# Patient Record
Sex: FEMALE | Race: WHITE | NOT HISPANIC OR LATINO | Employment: FULL TIME | URBAN - METROPOLITAN AREA
[De-identification: names, ages, dates, MRNs, and addresses within clinical notes are randomized per-mention and may not be internally consistent; named-entity substitution may affect disease eponyms.]

---

## 2017-03-13 RX ORDER — ALPRAZOLAM 0.5 MG/1
TABLET ORAL
Qty: 30 TAB | Refills: 0 | Status: SHIPPED
Start: 2017-03-13 | End: 2017-03-16 | Stop reason: SDUPTHER

## 2017-03-14 NOTE — TELEPHONE ENCOUNTER
Was the patient seen in the last year in this department? Yes  LAST OV 3/17/16    Does patient have an active prescription for medications requested? No     Received Request Via: Pharmacy

## 2017-03-16 ENCOUNTER — OFFICE VISIT (OUTPATIENT)
Dept: MEDICAL GROUP | Age: 37
End: 2017-03-16
Payer: COMMERCIAL

## 2017-03-16 VITALS
SYSTOLIC BLOOD PRESSURE: 122 MMHG | BODY MASS INDEX: 19.62 KG/M2 | WEIGHT: 125 LBS | OXYGEN SATURATION: 99 % | HEART RATE: 74 BPM | HEIGHT: 67 IN | DIASTOLIC BLOOD PRESSURE: 68 MMHG | TEMPERATURE: 97.8 F

## 2017-03-16 DIAGNOSIS — F41.9 ANXIETY: ICD-10-CM

## 2017-03-16 DIAGNOSIS — Z23 NEED FOR TDAP VACCINATION: ICD-10-CM

## 2017-03-16 DIAGNOSIS — E78.2 MIXED HYPERLIPIDEMIA: ICD-10-CM

## 2017-03-16 DIAGNOSIS — F51.01 PRIMARY INSOMNIA: ICD-10-CM

## 2017-03-16 DIAGNOSIS — G43.109 MIGRAINE WITH AURA AND WITHOUT STATUS MIGRAINOSUS, NOT INTRACTABLE: ICD-10-CM

## 2017-03-16 PROCEDURE — 90471 IMMUNIZATION ADMIN: CPT | Performed by: INTERNAL MEDICINE

## 2017-03-16 PROCEDURE — 90715 TDAP VACCINE 7 YRS/> IM: CPT | Performed by: INTERNAL MEDICINE

## 2017-03-16 PROCEDURE — 99214 OFFICE O/P EST MOD 30 MIN: CPT | Mod: 25 | Performed by: INTERNAL MEDICINE

## 2017-03-16 RX ORDER — ALPRAZOLAM 0.5 MG/1
0.5 TABLET ORAL 2 TIMES DAILY PRN
Qty: 30 TAB | Refills: 3 | Status: SHIPPED | OUTPATIENT
Start: 2017-03-16 | End: 2018-02-11 | Stop reason: SDUPTHER

## 2017-03-16 RX ORDER — OXYCODONE HYDROCHLORIDE AND ACETAMINOPHEN 5; 325 MG/1; MG/1
1-2 TABLET ORAL EVERY 4 HOURS PRN
Qty: 30 TAB | Refills: 0 | Status: SHIPPED | OUTPATIENT
Start: 2017-03-16 | End: 2021-01-21

## 2017-03-16 ASSESSMENT — ENCOUNTER SYMPTOMS
CARDIOVASCULAR NEGATIVE: 1
MUSCULOSKELETAL NEGATIVE: 1
RESPIRATORY NEGATIVE: 1
GASTROINTESTINAL NEGATIVE: 1
NEUROLOGICAL NEGATIVE: 1
PSYCHIATRIC NEGATIVE: 1
CONSTITUTIONAL NEGATIVE: 1
EYES NEGATIVE: 1

## 2017-03-16 ASSESSMENT — PAIN SCALES - GENERAL: PAINLEVEL: 2=MINIMAL-SLIGHT

## 2017-03-16 ASSESSMENT — PATIENT HEALTH QUESTIONNAIRE - PHQ9: CLINICAL INTERPRETATION OF PHQ2 SCORE: 0

## 2017-03-16 NOTE — MR AVS SNAPSHOT
"Gem Grubbs   3/16/2017 11:00 AM   Office Visit   MRN: [id number]    Department:  [facility]   Dept Phone:  [phone]    Description:  Female : 1980   Provider:  Giuseppe See M.D.           Reason for Visit     Follow-Up med refill xanax/percot/needs lab orders      Allergies as of 3/16/2017     Allergen Noted Reactions    Codeine 2014         You were diagnosed with     Anxiety   [672527]       Primary insomnia   [213823]       Migraine with aura and without status migrainosus, not intractable   [911676]       Need for Tdap vaccination   [[id number]]         Vital Signs     Blood Pressure Pulse Temperature Height Weight Body Mass Index    122/68 mmHg 74 36.6 °C (97.8 °F) 1.702 m (5' 7\") 56.7 kg (125 lb) 19.57 kg/m2    Oxygen Saturation Last Menstrual Period Breastfeeding? Smoking Status          99% 2017 No Never Smoker         Basic Information     Date Of Birth Sex Race Ethnicity Preferred Language    1980 Female White Non- English      Problem List              ICD-10-CM Priority Class Noted - Resolved    Migraine with aura and without status migrainosus, not intractable G43.109   8/10/2015 - Present    Anxiety F41.9   8/10/2015 - Present    Insomnia G47.00   8/10/2015 - Present      Health Maintenance        Date Due Completion Dates    IMM DTaP/Tdap/Td Vaccine (1 - Tdap) 1999 ---    IMM INFLUENZA (1) 2016 ---    PAP SMEAR 2019, 2013 (Prv Comp)    Override on 2013: Previously completed            Current Immunizations     Tdap Vaccine 3/16/2017      Below and/or attached are the medications your provider expects you to take. Review all of your home medications and newly ordered medications with your provider and/or pharmacist. Follow medication instructions as directed by your provider and/or pharmacist. Please keep your medication list with you and share with your provider. Update the information when medications are " discontinued, doses are changed, or new medications (including over-the-counter products) are added; and carry medication information at all times in the event of emergency situations     Allergies:  CODEINE - (reactions not documented)               Medications  Valid as of: March 16, 2017 - 12:25 PM    Generic Name Brand Name Tablet Size Instructions for use    ALPRAZolam (Tab) XANAX 0.5 MG Take 1 Tab by mouth 2 times a day as needed. For anxiety and/or insomnia        Oxycodone-Acetaminophen (Tab) PERCOCET 5-325 MG Take 1-2 Tabs by mouth every four hours as needed.        SUMAtriptan Succinate (Tab) IMITREX 100 MG TAKE ONE AT THE ONSET OF MIGRAINE HEADACHE. NOT TO EXCEED 2 IN A 24-HO UR PERIOD        .                 Medicines prescribed today were sent to:     [id number] - [place], [place] - [address]    [address] [place] [address]    Phone: [phone] Fax: [phone]    Open 24 Hours?: No      Medication refill instructions:       If your prescription bottle indicates you have medication refills left, it is not necessary to call your provider’s office. Please contact your pharmacy and they will refill your medication.    If your prescription bottle indicates you do not have any refills left, you may request refills at any time through one of the following ways: The online [employer] system (except Urgent Care), by calling your provider’s office, or by asking your pharmacy to contact your provider’s office with a refill request. Medication refills are processed only during regular business hours and may not be available until the next business day. Your provider may request additional information or to have a follow-up visit with you prior to refilling your medication.   *Please Note: Medication refills are assigned a new Rx number when refilled electronically. Your pharmacy may indicate that no refills were authorized even though a new prescription for the same medication is available at the  pharmacy. Please request the medicine by name with the pharmacy before contacting your provider for a refill.           MyChart Access Code: Activation code not generated  Current MyChart Status: Active

## 2017-03-17 PROBLEM — E78.2 MIXED HYPERLIPIDEMIA: Status: ACTIVE | Noted: 2017-03-17

## 2017-03-17 NOTE — PROGRESS NOTES
"Subjective:      Gem Grubbs is a 36 y.o. female who presents with Follow-Up  The patient is here for followup of chronic medical problems listed below. The patient is compliant with medications and having no side effects from them. Denies chest pain, abdominal pain, dyspnea, myalgias, or cough.     Patient Active Problem List    Diagnosis Date Noted   • Migraine with aura and without status migrainosus, not intractable 08/10/2015   • Anxiety 08/10/2015   • Insomnia 08/10/2015       Allergies   Allergen Reactions   • Codeine      Outpatient Prescriptions Prior to Visit   Medication Sig Dispense Refill   • sumatriptan (IMITREX) 100 MG tablet TAKE ONE AT THE ONSET OF MIGRAINE HEADACHE. NOT TO EXCEED 2 IN A 24-HO UR PERIOD 9 Tab 13   • alprazolam (XANAX) 0.5 MG Tab TAKE ONE TABLET BY MOUTH TWICE DAILY AS NEEDED FOR SLEEP OR ANXIETY 30 Tab 0   • oxycodone-acetaminophen (PERCOCET) 5-325 MG Tab Take 1-2 Tabs by mouth every four hours as needed. 60 Tab 0     No facility-administered medications prior to visit.               HPI    Review of Systems   Constitutional: Negative.    HENT: Negative.    Eyes: Negative.    Respiratory: Negative.    Cardiovascular: Negative.    Gastrointestinal: Negative.    Genitourinary: Negative.    Musculoskeletal: Negative.    Skin: Negative.    Neurological: Negative.    Endo/Heme/Allergies: Negative.    Psychiatric/Behavioral: Negative.           Objective:     /68 mmHg  Pulse 74  Temp(Src) 36.6 °C (97.8 °F)  Ht 1.702 m (5' 7\")  Wt 56.7 kg (125 lb)  BMI 19.57 kg/m2  SpO2 99%  LMP 03/02/2017  Breastfeeding? No     Physical Exam   Constitutional: She is oriented to person, place, and time. She appears well-developed and well-nourished.   HENT:   Head: Normocephalic and atraumatic.   Right Ear: External ear normal.   Left Ear: External ear normal.   Nose: Nose normal.   Mouth/Throat: Oropharynx is clear and moist.   Eyes: Conjunctivae and EOM are normal. Pupils are " equal, round, and reactive to light. Right eye exhibits no discharge. Left eye exhibits no discharge. No scleral icterus.   Neck: Normal range of motion. Neck supple. No JVD present. No tracheal deviation present. No thyromegaly present.   Cardiovascular: Normal rate, regular rhythm, normal heart sounds and intact distal pulses.  Exam reveals no gallop and no friction rub.    Pulmonary/Chest: Effort normal and breath sounds normal. No stridor. No respiratory distress. She has no wheezes. She has no rales. She exhibits no tenderness.   Abdominal: Soft. Bowel sounds are normal. She exhibits no distension and no mass. There is no tenderness. There is no rebound and no guarding. No hernia.   Musculoskeletal: Normal range of motion. She exhibits no edema or tenderness.   Lymphadenopathy:     She has no cervical adenopathy.   Neurological: She is alert and oriented to person, place, and time. She has normal reflexes. She displays normal reflexes. No cranial nerve deficit. Coordination normal.   Skin: Skin is warm and dry. No rash noted. No erythema. No pallor.   Psychiatric: She has a normal mood and affect. Her behavior is normal. Judgment and thought content normal.   Nursing note and vitals reviewed.  No visits with results within 1 Month(s) from this visit.  Latest known visit with results is:    Hospital Outpatient Visit on 06/22/2016   Component Date Value   • Cytology Reg 06/22/2016 See Path Report    • Source 06/22/2016 Cervical    • HPV Genotype 16 06/22/2016 Negative    • HPV Genotype 18 06/22/2016 Negative    • HPV Other High Risk Louise* 06/22/2016 Negative       No results found for: HBA1C  Lab Results   Component Value Date/Time    SODIUM 139 09/11/2014 02:41 PM    POTASSIUM 3.8 09/11/2014 02:41 PM    CHLORIDE 104 09/11/2014 02:41 PM    CO2 29 09/11/2014 02:41 PM    GLUCOSE 65 09/11/2014 02:41 PM    BUN 14 09/11/2014 02:41 PM    CREATININE 0.98 09/11/2014 02:41 PM    ALKALINE PHOSPHATASE 55 09/11/2014 02:41 PM     AST(SGOT) 18 09/11/2014 02:41 PM    ALT(SGPT) 13 09/11/2014 02:41 PM    TOTAL BILIRUBIN 0.5 09/11/2014 02:41 PM     No results found for: INR  Lab Results   Component Value Date/Time    CHOLESTEROL, 09/11/2014 02:41 PM    * 09/11/2014 02:41 PM    HDL 51 09/11/2014 02:41 PM    TRIGLYCERIDES 124 09/11/2014 02:41 PM       No results found for: TESTOSTERONE  No results found for: TSH  Lab Results   Component Value Date/Time    FREE T-4 1.02 09/11/2014 02:41 PM     No results found for: URICACID  No components found for: VITB12  Lab Results   Component Value Date/Time    25-HYDROXY   VITAMIN D 25 44 09/11/2014 02:41 PM                    Assessment/Plan:     1. AnxietyUnder good control. Continue same regimen.     - alprazolam (XANAX) 0.5 MG Tab; Take 1 Tab by mouth 2 times a day as needed. For anxiety and/or insomnia  Dispense: 30 Tab; Refill: 3    2. Primary insomnia   Under good control. Continue same regimen.  - alprazolam (XANAX) 0.5 MG Tab; Take 1 Tab by mouth 2 times a day as needed. For anxiety and/or insomnia  Dispense: 30 Tab; Refill: 3    3. Migraine with aura and without status migrainosus, not intractable   Under good control. Continue same regimen.  - oxycodone-acetaminophen (PERCOCET) 5-325 MG Tab; Take 1-2 Tabs by mouth every four hours as needed.  Dispense: 30 Tab; Refill: 0    4. Need for Tdap vaccination         - TDAP VACCINE =>6YO IM        5. Hyperlipidemia. Mild no medications. Check lipid panel again as well as other laboratory studies.      30 minute face-to-face encounter took place today.  More than half of this time was spent in the coordination of care of the above problems, as well as counseling.

## 2017-04-17 ENCOUNTER — HOSPITAL ENCOUNTER (OUTPATIENT)
Dept: LAB | Facility: MEDICAL CENTER | Age: 37
End: 2017-04-17
Attending: INTERNAL MEDICINE
Payer: COMMERCIAL

## 2017-04-17 DIAGNOSIS — E78.2 MIXED HYPERLIPIDEMIA: ICD-10-CM

## 2017-04-17 LAB
ALBUMIN SERPL BCP-MCNC: 4.4 G/DL (ref 3.2–4.9)
ALBUMIN/GLOB SERPL: 1.4 G/DL
ALP SERPL-CCNC: 57 U/L (ref 30–99)
ALT SERPL-CCNC: 10 U/L (ref 2–50)
ANION GAP SERPL CALC-SCNC: 4 MMOL/L (ref 0–11.9)
AST SERPL-CCNC: 17 U/L (ref 12–45)
BASOPHILS # BLD AUTO: 0.8 % (ref 0–1.8)
BASOPHILS # BLD: 0.05 K/UL (ref 0–0.12)
BILIRUB SERPL-MCNC: 0.4 MG/DL (ref 0.1–1.5)
BUN SERPL-MCNC: 14 MG/DL (ref 8–22)
CALCIUM SERPL-MCNC: 9.5 MG/DL (ref 8.5–10.5)
CHLORIDE SERPL-SCNC: 107 MMOL/L (ref 96–112)
CHOLEST SERPL-MCNC: 158 MG/DL (ref 100–199)
CO2 SERPL-SCNC: 26 MMOL/L (ref 20–33)
CREAT SERPL-MCNC: 0.9 MG/DL (ref 0.5–1.4)
EOSINOPHIL # BLD AUTO: 0.04 K/UL (ref 0–0.51)
EOSINOPHIL NFR BLD: 0.7 % (ref 0–6.9)
ERYTHROCYTE [DISTWIDTH] IN BLOOD BY AUTOMATED COUNT: 41.1 FL (ref 35.9–50)
GFR SERPL CREATININE-BSD FRML MDRD: >60 ML/MIN/1.73 M 2
GLOBULIN SER CALC-MCNC: 3.1 G/DL (ref 1.9–3.5)
GLUCOSE SERPL-MCNC: 80 MG/DL (ref 65–99)
HCT VFR BLD AUTO: 44.2 % (ref 37–47)
HDLC SERPL-MCNC: 58 MG/DL
HGB BLD-MCNC: 14.7 G/DL (ref 12–16)
IMM GRANULOCYTES # BLD AUTO: 0.02 K/UL (ref 0–0.11)
IMM GRANULOCYTES NFR BLD AUTO: 0.3 % (ref 0–0.9)
LDLC SERPL CALC-MCNC: 90 MG/DL
LYMPHOCYTES # BLD AUTO: 1.78 K/UL (ref 1–4.8)
LYMPHOCYTES NFR BLD: 29.8 % (ref 22–41)
MCH RBC QN AUTO: 29.4 PG (ref 27–33)
MCHC RBC AUTO-ENTMCNC: 33.3 G/DL (ref 33.6–35)
MCV RBC AUTO: 88.4 FL (ref 81.4–97.8)
MONOCYTES # BLD AUTO: 0.41 K/UL (ref 0–0.85)
MONOCYTES NFR BLD AUTO: 6.9 % (ref 0–13.4)
NEUTROPHILS # BLD AUTO: 3.67 K/UL (ref 2–7.15)
NEUTROPHILS NFR BLD: 61.5 % (ref 44–72)
NRBC # BLD AUTO: 0 K/UL
NRBC BLD AUTO-RTO: 0 /100 WBC
PLATELET # BLD AUTO: 269 K/UL (ref 164–446)
PMV BLD AUTO: 11.1 FL (ref 9–12.9)
POTASSIUM SERPL-SCNC: 5.2 MMOL/L (ref 3.6–5.5)
PROT SERPL-MCNC: 7.5 G/DL (ref 6–8.2)
RBC # BLD AUTO: 5 M/UL (ref 4.2–5.4)
SODIUM SERPL-SCNC: 137 MMOL/L (ref 135–145)
TRIGL SERPL-MCNC: 51 MG/DL (ref 0–149)
TSH SERPL DL<=0.005 MIU/L-ACNC: 1.21 UIU/ML (ref 0.3–3.7)
WBC # BLD AUTO: 6 K/UL (ref 4.8–10.8)

## 2017-04-17 PROCEDURE — 36415 COLL VENOUS BLD VENIPUNCTURE: CPT

## 2017-04-17 PROCEDURE — 80061 LIPID PANEL: CPT

## 2017-04-17 PROCEDURE — 85025 COMPLETE CBC W/AUTO DIFF WBC: CPT

## 2017-04-17 PROCEDURE — 84443 ASSAY THYROID STIM HORMONE: CPT

## 2017-04-17 PROCEDURE — 80053 COMPREHEN METABOLIC PANEL: CPT

## 2017-04-25 ENCOUNTER — TELEPHONE (OUTPATIENT)
Dept: MEDICAL GROUP | Age: 37
End: 2017-04-25

## 2017-04-25 NOTE — TELEPHONE ENCOUNTER
ESTABLISHED PATIENT PRE-VISIT PLANNING     Note: Patient will not be contacted if there is no indication to call.     1.  Reviewed note from last office visit with PCP and/or other med group provider: Yes    2.  If any orders were placed at last visit, do we have Results/Consult Notes?        •  Labs - Labs ordered, completed and results are in chart.       •  Imaging - Imaging was not ordered at last office visit.       •  Referrals - No referrals were ordered at last office visit.    3.  Immunizations were updated in [facility] using [facility]?: Yes       •  Web [employer] Recommendations: HEPATITIS A  HEPATITIS B MMR  TD VARICELLA (Chicken Pox)     4.  Patient is due for the following Health Maintenance Topics:   There are no preventive care reminders to display for this patient.    - Patient has completed TDAP Immunization(s) per WebIZ. Chart has been updated.

## 2017-04-26 ENCOUNTER — OFFICE VISIT (OUTPATIENT)
Dept: MEDICAL GROUP | Age: 37
End: 2017-04-26
Payer: COMMERCIAL

## 2017-04-26 VITALS
HEART RATE: 82 BPM | SYSTOLIC BLOOD PRESSURE: 102 MMHG | WEIGHT: 126 LBS | HEIGHT: 67 IN | OXYGEN SATURATION: 99 % | TEMPERATURE: 98.7 F | BODY MASS INDEX: 19.78 KG/M2 | DIASTOLIC BLOOD PRESSURE: 72 MMHG

## 2017-04-26 DIAGNOSIS — Z23 NEED FOR VACCINATION: ICD-10-CM

## 2017-04-26 DIAGNOSIS — F41.9 ANXIETY: ICD-10-CM

## 2017-04-26 DIAGNOSIS — F51.01 PRIMARY INSOMNIA: ICD-10-CM

## 2017-04-26 DIAGNOSIS — G43.109 MIGRAINE WITH AURA AND WITHOUT STATUS MIGRAINOSUS, NOT INTRACTABLE: ICD-10-CM

## 2017-04-26 DIAGNOSIS — E78.2 MIXED HYPERLIPIDEMIA: ICD-10-CM

## 2017-04-26 PROCEDURE — 90471 IMMUNIZATION ADMIN: CPT | Performed by: INTERNAL MEDICINE

## 2017-04-26 PROCEDURE — 90746 HEPB VACCINE 3 DOSE ADULT IM: CPT | Performed by: INTERNAL MEDICINE

## 2017-04-26 PROCEDURE — 99214 OFFICE O/P EST MOD 30 MIN: CPT | Mod: 25 | Performed by: INTERNAL MEDICINE

## 2017-04-26 PROCEDURE — 90632 HEPA VACCINE ADULT IM: CPT | Performed by: INTERNAL MEDICINE

## 2017-04-26 PROCEDURE — 90472 IMMUNIZATION ADMIN EACH ADD: CPT | Performed by: INTERNAL MEDICINE

## 2017-04-26 ASSESSMENT — ENCOUNTER SYMPTOMS
NEUROLOGICAL NEGATIVE: 1
PSYCHIATRIC NEGATIVE: 1
GASTROINTESTINAL NEGATIVE: 1
CARDIOVASCULAR NEGATIVE: 1
MUSCULOSKELETAL NEGATIVE: 1
CONSTITUTIONAL NEGATIVE: 1
RESPIRATORY NEGATIVE: 1
EYES NEGATIVE: 1

## 2017-04-26 NOTE — PROGRESS NOTES
"Subjective:      Gem Grubbs is a 37 y.o. female who presents with Results      Patient Active Problem List    Diagnosis Date Noted   • Need for vaccination- hep A and B- to be done aug 2017 (hep B #2) and oct 2017 (hep B #3 and hep A #2) 04/26/2017   • Mixed hyperlipidemia- mild no meds- resolved 03/17/2017   • Migraine with aura and without status migrainosus, not intractable 08/10/2015   • Anxiety 08/10/2015   • Insomnia 08/10/2015         Allergies   Allergen Reactions   • Codeine      Outpatient Prescriptions Prior to Visit   Medication Sig Dispense Refill   • sumatriptan (IMITREX) 100 MG tablet TAKE ONE AT THE ONSET OF MIGRAINE HEADACHE. NOT TO EXCEED 2 IN A 24-HO UR PERIOD 9 Tab 13   • alprazolam (XANAX) 0.5 MG Tab Take 1 Tab by mouth 2 times a day as needed. For anxiety and/or insomnia 30 Tab 3   • oxycodone-acetaminophen (PERCOCET) 5-325 MG Tab Take 1-2 Tabs by mouth every four hours as needed. 30 Tab 0     No facility-administered medications prior to visit.                 HPI    Review of Systems   Constitutional: Negative.    HENT: Negative.    Eyes: Negative.    Respiratory: Negative.    Cardiovascular: Negative.    Gastrointestinal: Negative.    Genitourinary: Negative.    Musculoskeletal: Negative.    Skin: Negative.    Neurological: Negative.    Endo/Heme/Allergies: Negative.    Psychiatric/Behavioral: Negative.           Objective:     /72 mmHg  Pulse 82  Temp(Src) 37.1 °C (98.7 °F)  Ht 1.695 m (5' 6.73\")  Wt 57.153 kg (126 lb)  BMI 19.89 kg/m2  SpO2 99%     Physical Exam   Constitutional: She is oriented to person, place, and time. She appears well-developed and well-nourished.   HENT:   Head: Normocephalic and atraumatic.   Right Ear: External ear normal.   Left Ear: External ear normal.   Nose: Nose normal.   Mouth/Throat: Oropharynx is clear and moist.   Eyes: Conjunctivae and EOM are normal. Pupils are equal, round, and reactive to light. Right eye exhibits no discharge. " Left eye exhibits no discharge. No scleral icterus.   Neck: Normal range of motion. Neck supple. No JVD present. No tracheal deviation present. No thyromegaly present.   Cardiovascular: Normal rate, regular rhythm, normal heart sounds and intact distal pulses.  Exam reveals no gallop and no friction rub.    Pulmonary/Chest: Effort normal and breath sounds normal. No stridor. No respiratory distress. She has no wheezes. She has no rales. She exhibits no tenderness.   Abdominal: Soft. Bowel sounds are normal. She exhibits no distension and no mass. There is no tenderness. There is no rebound and no guarding. No hernia.   Musculoskeletal: Normal range of motion. She exhibits no edema or tenderness.   Lymphadenopathy:     She has no cervical adenopathy.   Neurological: She is alert and oriented to person, place, and time. She has normal reflexes. She displays normal reflexes. No cranial nerve deficit. Coordination normal.   Skin: Skin is warm and dry. No rash noted. No erythema. No pallor.   Psychiatric: She has a normal mood and affect. Her behavior is normal. Judgment and thought content normal.   Nursing note and vitals reviewed.    Hospital Outpatient Visit on 04/17/2017   Component Date Value   • TSH 04/17/2017 1.210    • Sodium 04/17/2017 137    • Potassium 04/17/2017 5.2    • Chloride 04/17/2017 107    • Co2 04/17/2017 26    • Anion Gap 04/17/2017 4.0    • Glucose 04/17/2017 80    • Bun 04/17/2017 14    • Creatinine 04/17/2017 0.90    • Calcium 04/17/2017 9.5    • AST(SGOT) 04/17/2017 17    • ALT(SGPT) 04/17/2017 10    • Alkaline Phosphatase 04/17/2017 57    • Total Bilirubin 04/17/2017 0.4    • Albumin 04/17/2017 4.4    • Total Protein 04/17/2017 7.5    • Globulin 04/17/2017 3.1    • A-G Ratio 04/17/2017 1.4    • Cholesterol,Tot 04/17/2017 158    • Triglycerides 04/17/2017 51    • HDL 04/17/2017 58    • LDL 04/17/2017 90    • WBC 04/17/2017 6.0    • RBC 04/17/2017 5.00    • Hemoglobin 04/17/2017 14.7    •  Hematocrit 04/17/2017 44.2    • MCV 04/17/2017 88.4    • MCH 04/17/2017 29.4    • MCHC 04/17/2017 33.3*   • RDW 04/17/2017 41.1    • Platelet Count 04/17/2017 269    • MPV 04/17/2017 11.1    • Neutrophils-Polys 04/17/2017 61.50    • Lymphocytes 04/17/2017 29.80    • Monocytes 04/17/2017 6.90    • Eosinophils 04/17/2017 0.70    • Basophils 04/17/2017 0.80    • Immature Granulocytes 04/17/2017 0.30    • Nucleated RBC 04/17/2017 0.00    • Neutrophils (Absolute) 04/17/2017 3.67    • Lymphs (Absolute) 04/17/2017 1.78    • Monos (Absolute) 04/17/2017 0.41    • Eos (Absolute) 04/17/2017 0.04    • Baso (Absolute) 04/17/2017 0.05    • Immature Granulocytes (a* 04/17/2017 0.02    • NRBC (Absolute) 04/17/2017 0.00    • GFR If  04/17/2017 >60    • GFR If Non  [employer]* 04/17/2017 >60       No results found for: HBA1C  Lab Results   Component Value Date/Time    SODIUM 137 04/17/2017 09:31 AM    POTASSIUM 5.2 04/17/2017 09:31 AM    CHLORIDE 107 04/17/2017 09:31 AM    CO2 26 04/17/2017 09:31 AM    GLUCOSE 80 04/17/2017 09:31 AM    BUN 14 04/17/2017 09:31 AM    CREATININE 0.90 04/17/2017 09:31 AM    ALKALINE PHOSPHATASE 57 04/17/2017 09:31 AM    AST(SGOT) 17 04/17/2017 09:31 AM    ALT(SGPT) 10 04/17/2017 09:31 AM    TOTAL BILIRUBIN 0.4 04/17/2017 09:31 AM     No results found for: INR  Lab Results   Component Value Date/Time    CHOLESTEROL, 04/17/2017 09:31 AM    LDL 90 04/17/2017 09:31 AM    HDL 58 04/17/2017 09:31 AM    TRIGLYCERIDES 51 04/17/2017 09:31 AM       No results found for: TESTOSTERONE  No results found for: TSH  Lab Results   Component Value Date/Time    FREE T-4 1.02 09/11/2014 02:41 PM     No results found for: URICACID  No components found for: VITB12  Lab Results   Component Value Date/Time    25-HYDROXY   VITAMIN D 25 44 09/11/2014 02:41 PM                    Assessment/Plan:     1. Mixed hyperlipidemia- mild no meds- resolved     Removed from problem list. Under good control. Continue  excellent dietary practices and exercise.    2. Migraine with aura and without status migrainosus, not intractable   Much better since going on progesterone by GYN.Under good control. Continue same regimen.      3. Anxiety     Under good control. Continue same regimen. No longer requiring significant amounts of Xanax. Rarely uses it.     4. Primary insomnia     Under good control. Continue same regimen. As above     5. Need for vaccination- hep A and B      - HEPATITIS B VACCINE ADULT IM  - HEPATITIS A VACCINE ADULT IM  - HEPATITIS B VACCINE ADULT IM; Future  - HEPATITIS B VACCINE ADULT IM; Future  - HEPATITIS A VACCINE ADULT IM; Future      40 minute face-to-face encounter took place today.  More than half of this time was spent in the coordination of care of the above problems, as well as counseling.

## 2017-04-26 NOTE — PROGRESS NOTES
"Subjective:      Gem Grubbs is a 37 y.o. female who presents with Results  The patient is here for followup of chronic medical problems listed below. The patient is compliant with medications and having no side effects from them. Denies chest pain, abdominal pain, dyspnea, myalgias, or cough.   Patient Active Problem List    Diagnosis Date Noted   • Need for vaccination- hep A and B- to be done aug 2017 (hep B #2) and oct 2017 (hep B #3 and hep A #2) 04/26/2017   • Mixed hyperlipidemia- mild no meds- resolved 03/17/2017   • Migraine with aura and without status migrainosus, not intractable 08/10/2015   • Anxiety 08/10/2015   • Insomnia 08/10/2015     Allergies   Allergen Reactions   • Codeine         Outpatient Prescriptions Prior to Visit   Medication Sig Dispense Refill   • sumatriptan (IMITREX) 100 MG tablet TAKE ONE AT THE ONSET OF MIGRAINE HEADACHE. NOT TO EXCEED 2 IN A 24-HO UR PERIOD 9 Tab 13   • alprazolam (XANAX) 0.5 MG Tab Take 1 Tab by mouth 2 times a day as needed. For anxiety and/or insomnia 30 Tab 3   • oxycodone-acetaminophen (PERCOCET) 5-325 MG Tab Take 1-2 Tabs by mouth every four hours as needed. 30 Tab 0     No facility-administered medications prior to visit.               HPI    Review of Systems   Constitutional: Negative.    HENT: Negative.    Eyes: Negative.    Respiratory: Negative.    Cardiovascular: Negative.    Gastrointestinal: Negative.    Genitourinary: Negative.    Musculoskeletal: Negative.    Skin: Negative.    Neurological: Negative.    Endo/Heme/Allergies: Negative.    Psychiatric/Behavioral: Negative.           Objective:     /72 mmHg  Pulse 82  Temp(Src) 37.1 °C (98.7 °F)  Ht 1.695 m (5' 6.73\")  Wt 57.153 kg (126 lb)  BMI 19.89 kg/m2  SpO2 99%     Physical Exam   Constitutional: She is oriented to person, place, and time. She appears well-developed and well-nourished.   HENT:   Head: Normocephalic and atraumatic.   Right Ear: External ear normal.   Left " Ear: External ear normal.   Nose: Nose normal.   Mouth/Throat: Oropharynx is clear and moist.   Eyes: Conjunctivae and EOM are normal. Pupils are equal, round, and reactive to light. Right eye exhibits no discharge. Left eye exhibits no discharge. No scleral icterus.   Neck: Normal range of motion. Neck supple. No JVD present. No tracheal deviation present. No thyromegaly present.   Cardiovascular: Normal rate, regular rhythm, normal heart sounds and intact distal pulses.  Exam reveals no gallop and no friction rub.    Pulmonary/Chest: Effort normal and breath sounds normal. No stridor. No respiratory distress. She has no wheezes. She has no rales. She exhibits no tenderness.   Abdominal: Soft. Bowel sounds are normal. She exhibits no distension and no mass. There is no tenderness. There is no rebound and no guarding. No hernia.   Musculoskeletal: Normal range of motion. She exhibits no edema or tenderness.   Lymphadenopathy:     She has no cervical adenopathy.   Neurological: She is alert and oriented to person, place, and time. She has normal reflexes. She displays normal reflexes. No cranial nerve deficit. Coordination normal.   Skin: Skin is warm and dry. No rash noted. No erythema. No pallor.   Psychiatric: She has a normal mood and affect. Her behavior is normal. Judgment and thought content normal.   Nursing note and vitals reviewed.    Hospital Outpatient Visit on 04/17/2017   Component Date Value   • TSH 04/17/2017 1.210    • Sodium 04/17/2017 137    • Potassium 04/17/2017 5.2    • Chloride 04/17/2017 107    • Co2 04/17/2017 26    • Anion Gap 04/17/2017 4.0    • Glucose 04/17/2017 80    • Bun 04/17/2017 14    • Creatinine 04/17/2017 0.90    • Calcium 04/17/2017 9.5    • AST(SGOT) 04/17/2017 17    • ALT(SGPT) 04/17/2017 10    • Alkaline Phosphatase 04/17/2017 57    • Total Bilirubin 04/17/2017 0.4    • Albumin 04/17/2017 4.4    • Total Protein 04/17/2017 7.5    • Globulin 04/17/2017 3.1    • A-G Ratio  04/17/2017 1.4    • Cholesterol,Tot 04/17/2017 158    • Triglycerides 04/17/2017 51    • HDL 04/17/2017 58    • LDL 04/17/2017 90    • WBC 04/17/2017 6.0    • RBC 04/17/2017 5.00    • Hemoglobin 04/17/2017 14.7    • Hematocrit 04/17/2017 44.2    • MCV 04/17/2017 88.4    • MCH 04/17/2017 29.4    • MCHC 04/17/2017 33.3*   • RDW 04/17/2017 41.1    • Platelet Count 04/17/2017 269    • MPV 04/17/2017 11.1    • Neutrophils-Polys 04/17/2017 61.50    • Lymphocytes 04/17/2017 29.80    • Monocytes 04/17/2017 6.90    • Eosinophils 04/17/2017 0.70    • Basophils 04/17/2017 0.80    • Immature Granulocytes 04/17/2017 0.30    • Nucleated RBC 04/17/2017 0.00    • Neutrophils (Absolute) 04/17/2017 3.67    • Lymphs (Absolute) 04/17/2017 1.78    • Monos (Absolute) 04/17/2017 0.41    • Eos (Absolute) 04/17/2017 0.04    • Baso (Absolute) 04/17/2017 0.05    • Immature Granulocytes (a* 04/17/2017 0.02    • NRBC (Absolute) 04/17/2017 0.00    • GFR If  04/17/2017 >60    • GFR If Non  Ameri* 04/17/2017 >60       No results found for: HBA1C  Lab Results   Component Value Date/Time    SODIUM 137 04/17/2017 09:31 AM    POTASSIUM 5.2 04/17/2017 09:31 AM    CHLORIDE 107 04/17/2017 09:31 AM    CO2 26 04/17/2017 09:31 AM    GLUCOSE 80 04/17/2017 09:31 AM    BUN 14 04/17/2017 09:31 AM    CREATININE 0.90 04/17/2017 09:31 AM    ALKALINE PHOSPHATASE 57 04/17/2017 09:31 AM    AST(SGOT) 17 04/17/2017 09:31 AM    ALT(SGPT) 10 04/17/2017 09:31 AM    TOTAL BILIRUBIN 0.4 04/17/2017 09:31 AM     No results found for: INR  Lab Results   Component Value Date/Time    CHOLESTEROL, 04/17/2017 09:31 AM    LDL 90 04/17/2017 09:31 AM    HDL 58 04/17/2017 09:31 AM    TRIGLYCERIDES 51 04/17/2017 09:31 AM       No results found for: TESTOSTERONE  No results found for: TSH  Lab Results   Component Value Date/Time    FREE T-4 1.02 09/11/2014 02:41 PM     No results found for: URICACID  No components found for: VITB12  Lab Results   Component  Value Date/Time    25-HYDROXY   VITAMIN D 25 44 09/11/2014 02:41 PM                  Assessment/Plan:     1. Mixed hyperlipidemia- mild no meds- resolved  Under good control. Continue same regimen.       2. Migraine with aura and without status migrainosus, not intractable  Under good control. Continue same regimen.        3. Anxiety  Under good control. Continue same regimen.         4. Primary insomnia  Under good control. Continue same regimen.       5. Need for vaccination- hep A and B     - HEPATITIS B VACCINE ADULT IM  - HEPATITIS A VACCINE ADULT IM  - HEPATITIS B VACCINE ADULT IM; Future  - HEPATITIS B VACCINE ADULT IM; Future  - HEPATITIS A VACCINE ADULT IM; Future      30 minute face-to-face encounter took place today.  More than half of this time was spent in the coordination of care of the above problems, as well as counseling.

## 2017-04-26 NOTE — MR AVS SNAPSHOT
"        Gem Grubbs   2017 8:40 AM   Office Visit   MRN: [id number]    Department:  [address] [place] [address]   Dept Phone:  [phone]    Description:  Female : 1980   Provider:  Giuseppe See M.D.           Reason for Visit     Results Lab review      Allergies as of 2017     Allergen Noted Reactions    Codeine 2014         You were diagnosed with     Mixed hyperlipidemia   [272.2.ICD-9-CM]       Migraine with aura and without status migrainosus, not intractable   [829423]       Anxiety   [713616]       Primary insomnia   [373055]       Need for vaccination   [726194]         Vital Signs     Blood Pressure Pulse Temperature Height Weight Body Mass Index    102/72 mmHg 82 37.1 °C (98.7 °F) 1.695 m (5' 6.73\") 57.153 kg (126 lb) 19.89 kg/m2    Oxygen Saturation Smoking Status                99% Never Smoker           Basic Information     Date Of Birth Sex Race Ethnicity Preferred Language    1980 Female White Non- English      Your appointments     2017  8:45 AM   Non Provider 1 with [address])    [address] [place] [id number] [phone]           You will be receiving a confirmation call a few days before your appointment from our automated call confirmation system.              Problem List              ICD-10-CM Priority Class Noted - Resolved    Migraine with aura and without status migrainosus, not intractable G43.109   8/10/2015 - Present    Anxiety F41.9   8/10/2015 - Present    Insomnia G47.00   8/10/2015 - Present    Mixed hyperlipidemia- mild no meds- resolved E78.2   3/17/2017 - Present    Need for vaccination- hep A and B- to be done aug 2017 (hep B #2) and oct 2017 (hep B #3 and hep A #2) Z23   2017 - Present      Health Maintenance        Date Due Completion Dates    PAP SMEAR 2019, 2013 (Prv Comp)    Override on 2013: Previously completed    IMM " DTaP/Tdap/Td Vaccine (2 - Td) 3/16/2027 3/16/2017            Current Immunizations     Hepatitis A Vaccine, Adult  Incomplete    Hepatitis B Vaccine Recombivax (Adol/Adult)  Incomplete    Tdap Vaccine 3/16/2017      Below and/or attached are the medications your provider expects you to take. Review all of your home medications and newly ordered medications with your provider and/or pharmacist. Follow medication instructions as directed by your provider and/or pharmacist. Please keep your medication list with you and share with your provider. Update the information when medications are discontinued, doses are changed, or new medications (including over-the-counter products) are added; and carry medication information at all times in the event of emergency situations     Allergies:  CODEINE - (reactions not documented)               Medications  Valid as of: April 26, 2017 -  9:50 AM    Generic Name Brand Name Tablet Size Instructions for use    ALPRAZolam (Tab) XANAX 0.5 MG Take 1 Tab by mouth 2 times a day as needed. For anxiety and/or insomnia        Oxycodone-Acetaminophen (Tab) PERCOCET 5-325 MG Take 1-2 Tabs by mouth every four hours as needed.        SUMAtriptan Succinate (Tab) IMITREX 100 MG TAKE ONE AT THE ONSET OF MIGRAINE HEADACHE. NOT TO EXCEED 2 IN A 24-HO UR PERIOD        .                 Medicines prescribed today were sent to:     [id number] - [place], [place] - [id number] [facility] [place] [address]    Phone: [phone] Fax: [phone]    Open 24 Hours?: No      Medication refill instructions:       If your prescription bottle indicates you have medication refills left, it is not necessary to call your provider’s office. Please contact your pharmacy and they will refill your medication.    If your prescription bottle indicates you do not have any refills left, you may request refills at any time through one of the following ways: The online [employer] system (except Urgent Care),  by calling your provider’s office, or by asking your pharmacy to contact your provider’s office with a refill request. Medication refills are processed only during regular business hours and may not be available until the next business day. Your provider may request additional information or to have a follow-up visit with you prior to refilling your medication.   *Please Note: Medication refills are assigned a new Rx number when refilled electronically. Your pharmacy may indicate that no refills were authorized even though a new prescription for the same medication is available at the pharmacy. Please request the medicine by name with the pharmacy before contacting your provider for a refill.        Your To Do List     Future Labs/Procedures Complete By Expires    HEPATITIS B VACCINE ADULT IM  8/28/2017 (Approximate) 4/26/2026    Scheduling Instructions:    Hep B #2    HEPATITIS A VACCINE ADULT IM  10/26/2017 (Approximate) 4/26/2026    Scheduling Instructions:    Hep A #2- final    HEPATITIS B VACCINE ADULT IM  10/26/2017 (Approximate) 4/26/2026    Scheduling Instructions:    Hep B #3- final         MyChart Access Code: Activation code not generated  Current [facility] Status: Active

## 2017-06-28 ENCOUNTER — APPOINTMENT (OUTPATIENT)
Dept: MEDICAL GROUP | Age: 37
End: 2017-06-28
Payer: COMMERCIAL

## 2017-10-24 DIAGNOSIS — Z23 NEED FOR VACCINATION: ICD-10-CM

## 2017-10-25 ENCOUNTER — APPOINTMENT (OUTPATIENT)
Dept: MEDICAL GROUP | Age: 37
End: 2017-10-25
Payer: COMMERCIAL

## 2017-11-02 ENCOUNTER — NON-PROVIDER VISIT (OUTPATIENT)
Dept: MEDICAL GROUP | Age: 37
End: 2017-11-02
Payer: COMMERCIAL

## 2017-11-02 ENCOUNTER — TELEPHONE (OUTPATIENT)
Dept: MEDICAL GROUP | Age: 37
End: 2017-11-02

## 2017-11-02 ENCOUNTER — HOSPITAL ENCOUNTER (OUTPATIENT)
Dept: RADIOLOGY | Facility: MEDICAL CENTER | Age: 37
End: 2017-11-02

## 2017-11-02 DIAGNOSIS — Z23 NEED FOR VACCINATION: ICD-10-CM

## 2017-11-02 PROCEDURE — 90471 IMMUNIZATION ADMIN: CPT | Performed by: INTERNAL MEDICINE

## 2017-11-02 PROCEDURE — 90632 HEPA VACCINE ADULT IM: CPT | Performed by: INTERNAL MEDICINE

## 2017-11-02 PROCEDURE — 90472 IMMUNIZATION ADMIN EACH ADD: CPT | Performed by: INTERNAL MEDICINE

## 2017-11-02 PROCEDURE — 90746 HEPB VACCINE 3 DOSE ADULT IM: CPT | Performed by: INTERNAL MEDICINE

## 2017-11-02 PROCEDURE — 90686 IIV4 VACC NO PRSV 0.5 ML IM: CPT | Performed by: INTERNAL MEDICINE

## 2017-11-02 NOTE — PROGRESS NOTES
"Gem Grubbs is a 37 y.o. female here for a non-provider visit for:   FLU    Reason for immunization: Annual Flu Vaccine  Immunization records indicate need for vaccine: Yes, confirmed with Epic and confirmed with [place] [employer]  Minimum interval has been met for this vaccine: Yes  ABN completed: Not Indicated    Order and dose verified by: KAREN  VIS Dated  08/07/2015 was given to patient: Yes  All IAC Questionnaire questions were answered \"No.\"    Patient tolerated injection and no adverse effects were observed or reported: Yes    Pt scheduled for next dose in series: Not Indicated    "

## 2017-11-02 NOTE — TELEPHONE ENCOUNTER
Patient is on the MA Schedule today for Flu vaccine/injection.    SPECIFIC Action To Be Taken: Orders pending, please sign.

## 2017-11-06 ENCOUNTER — HOSPITAL ENCOUNTER (OUTPATIENT)
Dept: RADIOLOGY | Facility: MEDICAL CENTER | Age: 37
End: 2017-11-06
Attending: OBSTETRICS & GYNECOLOGY
Payer: COMMERCIAL

## 2017-11-06 DIAGNOSIS — N63.0 LUMP OR MASS IN BREAST: ICD-10-CM

## 2017-11-06 PROCEDURE — G0279 TOMOSYNTHESIS, MAMMO: HCPCS

## 2017-11-06 PROCEDURE — 76642 ULTRASOUND BREAST LIMITED: CPT | Mod: RT

## 2017-11-30 DIAGNOSIS — G43.109 MIGRAINE WITH AURA AND WITHOUT STATUS MIGRAINOSUS, NOT INTRACTABLE: ICD-10-CM

## 2017-12-01 RX ORDER — SUMATRIPTAN 100 MG/1
TABLET, FILM COATED ORAL
Qty: 9 TAB | Refills: 0 | Status: SHIPPED | OUTPATIENT
Start: 2017-12-01 | End: 2018-06-08 | Stop reason: SDUPTHER

## 2018-01-10 ENCOUNTER — NON-PROVIDER VISIT (OUTPATIENT)
Dept: MEDICAL GROUP | Age: 38
End: 2018-01-10
Payer: COMMERCIAL

## 2018-01-10 DIAGNOSIS — Z23 NEED FOR VACCINATION: ICD-10-CM

## 2018-01-10 PROCEDURE — 90471 IMMUNIZATION ADMIN: CPT | Performed by: INTERNAL MEDICINE

## 2018-01-10 PROCEDURE — 90746 HEPB VACCINE 3 DOSE ADULT IM: CPT | Performed by: INTERNAL MEDICINE

## 2018-01-10 NOTE — PROGRESS NOTES
Gem Grubbs is a 37 y.o. female here for a non-provider visit for:   HEPATITIS B 3 of 3    Reason for immunization: continue or complete series started at the office  Immunization records indicate need for vaccine: Yes, confirmed with Epic and confirmed with [place] [employer]  Minimum interval has been met for this vaccine: Yes  ABN completed: Not Indicated    Order and dose verified by:   VIS Dated  7/20/16 was given to patient: Yes  IAC Questionnaire abnormal. Questionnaire reviewed and administration of injection approved by provider: Dr. Romero    Patient tolerated injection and no adverse effects were observed or reported: Yes    Pt scheduled for next dose in series: Not Indicated

## 2018-02-11 DIAGNOSIS — F41.9 ANXIETY: ICD-10-CM

## 2018-02-11 DIAGNOSIS — F51.01 PRIMARY INSOMNIA: ICD-10-CM

## 2018-02-12 ENCOUNTER — TELEPHONE (OUTPATIENT)
Dept: MEDICAL GROUP | Age: 38
End: 2018-02-12

## 2018-02-12 RX ORDER — ALPRAZOLAM 0.5 MG/1
TABLET ORAL
Qty: 30 TAB | Refills: 3 | Status: SHIPPED | OUTPATIENT
Start: 2018-02-12 | End: 2018-03-14

## 2018-05-02 ENCOUNTER — APPOINTMENT (OUTPATIENT)
Dept: MEDICAL GROUP | Age: 38
End: 2018-05-02
Payer: COMMERCIAL

## 2018-06-08 DIAGNOSIS — G43.109 MIGRAINE WITH AURA AND WITHOUT STATUS MIGRAINOSUS, NOT INTRACTABLE: ICD-10-CM

## 2018-06-08 RX ORDER — SUMATRIPTAN 100 MG/1
100 TABLET, FILM COATED ORAL
Qty: 9 TAB | Refills: 5 | Status: SHIPPED | OUTPATIENT
Start: 2018-06-08 | End: 2018-10-31 | Stop reason: SDUPTHER

## 2018-10-31 ENCOUNTER — OFFICE VISIT (OUTPATIENT)
Dept: MEDICAL GROUP | Age: 38
End: 2018-10-31
Payer: COMMERCIAL

## 2018-10-31 VITALS
DIASTOLIC BLOOD PRESSURE: 70 MMHG | HEIGHT: 67 IN | HEART RATE: 93 BPM | SYSTOLIC BLOOD PRESSURE: 122 MMHG | OXYGEN SATURATION: 99 % | WEIGHT: 138 LBS | BODY MASS INDEX: 21.66 KG/M2 | TEMPERATURE: 98.1 F

## 2018-10-31 DIAGNOSIS — F41.9 ANXIETY: ICD-10-CM

## 2018-10-31 DIAGNOSIS — F51.01 PRIMARY INSOMNIA: ICD-10-CM

## 2018-10-31 DIAGNOSIS — Z00.00 ROUTINE GENERAL MEDICAL EXAMINATION AT A HEALTH CARE FACILITY: ICD-10-CM

## 2018-10-31 DIAGNOSIS — G43.109 MIGRAINE WITH AURA AND WITHOUT STATUS MIGRAINOSUS, NOT INTRACTABLE: ICD-10-CM

## 2018-10-31 DIAGNOSIS — Z23 NEED FOR IMMUNIZATION AGAINST INFLUENZA: ICD-10-CM

## 2018-10-31 PROBLEM — E78.2 MIXED HYPERLIPIDEMIA: Status: RESOLVED | Noted: 2017-03-17 | Resolved: 2018-10-31

## 2018-10-31 PROCEDURE — 99395 PREV VISIT EST AGE 18-39: CPT | Mod: 25 | Performed by: INTERNAL MEDICINE

## 2018-10-31 PROCEDURE — 90686 IIV4 VACC NO PRSV 0.5 ML IM: CPT | Performed by: INTERNAL MEDICINE

## 2018-10-31 PROCEDURE — 90471 IMMUNIZATION ADMIN: CPT | Performed by: INTERNAL MEDICINE

## 2018-10-31 RX ORDER — SUMATRIPTAN 100 MG/1
100 TABLET, FILM COATED ORAL
Qty: 9 TAB | Refills: 5 | Status: SHIPPED | OUTPATIENT
Start: 2018-10-31 | End: 2020-03-09

## 2018-10-31 RX ORDER — ALPRAZOLAM 0.5 MG/1
0.5 TABLET ORAL
Qty: 30 TAB | Refills: 3 | Status: SHIPPED | OUTPATIENT
Start: 2018-10-31 | End: 2018-11-30

## 2018-10-31 ASSESSMENT — PATIENT HEALTH QUESTIONNAIRE - PHQ9: CLINICAL INTERPRETATION OF PHQ2 SCORE: 0

## 2018-11-03 ASSESSMENT — ENCOUNTER SYMPTOMS
CARDIOVASCULAR NEGATIVE: 1
RESPIRATORY NEGATIVE: 1
GASTROINTESTINAL NEGATIVE: 1
NEUROLOGICAL NEGATIVE: 1
PSYCHIATRIC NEGATIVE: 1
EYES NEGATIVE: 1
CONSTITUTIONAL NEGATIVE: 1
MUSCULOSKELETAL NEGATIVE: 1

## 2018-11-03 NOTE — PROGRESS NOTES
"Subjective:      Gem Grubbs is a 38 y.o. female who presents with Annual Exam  and.fu  Patient Active Problem List    Diagnosis Date Noted   • Migraine with aura and without status migrainosus, not intractable 08/10/2015   • Anxiety 08/10/2015   • Insomnia 08/10/2015     Allergies   Allergen Reactions   • Codeine      Outpatient Medications Prior to Visit   Medication Sig Dispense Refill   • sumatriptan (IMITREX) 100 MG tablet Take 1 Tab by mouth Once PRN for Migraine for up to 1 dose. 9 Tab 5   • oxycodone-acetaminophen (PERCOCET) 5-325 MG Tab Take 1-2 Tabs by mouth every four hours as needed. 30 Tab 0     No facility-administered medications prior to visit.                Annual Exam         Review of Systems   Constitutional: Negative.    HENT: Negative.    Eyes: Negative.    Respiratory: Negative.    Cardiovascular: Negative.    Gastrointestinal: Negative.    Genitourinary: Negative.    Musculoskeletal: Negative.    Skin: Negative.    Neurological: Negative.    Endo/Heme/Allergies: Negative.    Psychiatric/Behavioral: Negative.           Objective:     /70 (BP Location: Right arm, Patient Position: Sitting)   Pulse 93   Temp 36.7 °C (98.1 °F) (Temporal)   Ht 1.695 m (5' 6.73\")   Wt 62.6 kg (138 lb)   SpO2 99%   BMI 21.79 kg/m²      Physical Exam   Constitutional: She is oriented to person, place, and time. She appears well-developed and well-nourished. No distress.   HENT:   Head: Normocephalic and atraumatic.   Right Ear: External ear normal.   Left Ear: External ear normal.   Nose: Nose normal.   Mouth/Throat: Oropharynx is clear and moist. No oropharyngeal exudate.   Eyes: Pupils are equal, round, and reactive to light. Conjunctivae and EOM are normal. Right eye exhibits no discharge. Left eye exhibits no discharge. No scleral icterus.   Neck: Normal range of motion. Neck supple. No JVD present. No tracheal deviation present. No thyromegaly present.   Cardiovascular: Normal rate, regular " rhythm, normal heart sounds and intact distal pulses.  Exam reveals no gallop and no friction rub.    No murmur heard.  Pulmonary/Chest: Effort normal and breath sounds normal. No stridor. No respiratory distress. She has no wheezes. She has no rales. She exhibits no tenderness.   Abdominal: Soft. Bowel sounds are normal. She exhibits no distension and no mass. There is no tenderness. There is no rebound and no guarding.   Musculoskeletal: Normal range of motion. She exhibits no edema or tenderness.   Lymphadenopathy:     She has no cervical adenopathy.   Neurological: She is alert and oriented to person, place, and time. She has normal reflexes. She displays normal reflexes. No cranial nerve deficit. She exhibits normal muscle tone. Coordination normal.   Skin: Skin is warm and dry. No rash noted. She is not diaphoretic. No erythema. No pallor.   Psychiatric: She has a normal mood and affect. Her behavior is normal. Judgment and thought content normal.   Vitals reviewed.         No visits with results within 1 Month(s) from this visit.   Latest known visit with results is:   Hospital Outpatient Visit on 04/17/2017   Component Date Value   • TSH 04/17/2017 1.210    • Sodium 04/17/2017 137    • Potassium 04/17/2017 5.2    • Chloride 04/17/2017 107    • Co2 04/17/2017 26    • Anion Gap 04/17/2017 4.0    • Glucose 04/17/2017 80    • Bun 04/17/2017 14    • Creatinine 04/17/2017 0.90    • Calcium 04/17/2017 9.5    • AST(SGOT) 04/17/2017 17    • ALT(SGPT) 04/17/2017 10    • Alkaline Phosphatase 04/17/2017 57    • Total Bilirubin 04/17/2017 0.4    • Albumin 04/17/2017 4.4    • Total Protein 04/17/2017 7.5    • Globulin 04/17/2017 3.1    • A-G Ratio 04/17/2017 1.4    • Cholesterol,Tot 04/17/2017 158    • Triglycerides 04/17/2017 51    • HDL 04/17/2017 58    • LDL 04/17/2017 90    • WBC 04/17/2017 6.0    • RBC 04/17/2017 5.00    • Hemoglobin 04/17/2017 14.7    • Hematocrit 04/17/2017 44.2    • MCV 04/17/2017 88.4    • MCH  04/17/2017 29.4    • MCHC 04/17/2017 33.3*   • RDW 04/17/2017 41.1    • Platelet Count 04/17/2017 269    • MPV 04/17/2017 11.1    • Neutrophils-Polys 04/17/2017 61.50    • Lymphocytes 04/17/2017 29.80    • Monocytes 04/17/2017 6.90    • Eosinophils 04/17/2017 0.70    • Basophils 04/17/2017 0.80    • Immature Granulocytes 04/17/2017 0.30    • Nucleated RBC 04/17/2017 0.00    • Neutrophils (Absolute) 04/17/2017 3.67    • Lymphs (Absolute) 04/17/2017 1.78    • Monos (Absolute) 04/17/2017 0.41    • Eos (Absolute) 04/17/2017 0.04    • Baso (Absolute) 04/17/2017 0.05    • Immature Granulocytes (a* 04/17/2017 0.02    • NRBC (Absolute) 04/17/2017 0.00    • GFR If  04/17/2017 >60    • GFR If Non  Ameri* 04/17/2017 >60       No results found for: HBA1C  Lab Results   Component Value Date/Time    SODIUM 137 04/17/2017 09:31 AM    POTASSIUM 5.2 04/17/2017 09:31 AM    CHLORIDE 107 04/17/2017 09:31 AM    CO2 26 04/17/2017 09:31 AM    GLUCOSE 80 04/17/2017 09:31 AM    BUN 14 04/17/2017 09:31 AM    CREATININE 0.90 04/17/2017 09:31 AM    ALKPHOSPHAT 57 04/17/2017 09:31 AM    ASTSGOT 17 04/17/2017 09:31 AM    ALTSGPT 10 04/17/2017 09:31 AM    TBILIRUBIN 0.4 04/17/2017 09:31 AM     No results found for: INR  Lab Results   Component Value Date/Time    CHOLSTRLTOT 158 04/17/2017 09:31 AM    LDL 90 04/17/2017 09:31 AM    HDL 58 04/17/2017 09:31 AM    TRIGLYCERIDE 51 04/17/2017 09:31 AM       No results found for: TESTOSTERONE  No results found for: TSH  Lab Results   Component Value Date/Time    FREET4 1.02 09/11/2014 02:41 PM     No results found for: URICACID  No components found for: VITB12  Lab Results   Component Value Date/Time    25HYDROXY 44 09/11/2014 02:41 PM          Assessment/Plan:   1. Routine general medical examination at a health care facility    Normal  - COMP METABOLIC PANEL; Future  - LIPID PROFILE; Future  - CBC WITH DIFFERENTIAL; Future        2. Primary insomnia  Under good control. Continue  same regimen.       - ALPRAZolam (XANAX) 0.5 MG Tab; Take 1 Tab by mouth 1 time daily as needed for Sleep or Anxiety for up to 30 days. For anxiety and/or insomnia  Dispense: 30 Tab; Refill: 3    3. Migraine with aura and without status migrainosus, not intractable  Under good control. Continue same regimen.      - sumatriptan (IMITREX) 100 MG tablet; Take 1 Tab by mouth Once PRN for Migraine for up to 1 dose.  Dispense: 9 Tab; Refill: 5    4. Anxiety   Under good control. Continue same regimen.    - ALPRAZolam (XANAX) 0.5 MG Tab; Take 1 Tab by mouth 1 time daily as needed for Sleep or Anxiety for up to 30 days. For anxiety and/or insomnia  Dispense: 30 Tab; Refill: 3    5. Need for immunization against influenza     - Influenza Vaccine Quad Injection >3Y (PF)    30 minute face-to-face encounter took place today.  More than half of this time was spent in the coordination of care of the above problems, as well as counseling.

## 2020-03-08 DIAGNOSIS — G43.109 MIGRAINE WITH AURA AND WITHOUT STATUS MIGRAINOSUS, NOT INTRACTABLE: ICD-10-CM

## 2020-03-09 RX ORDER — SUMATRIPTAN 100 MG/1
TABLET, FILM COATED ORAL
Qty: 9 TAB | Refills: 0 | Status: SHIPPED | OUTPATIENT
Start: 2020-03-09 | End: 2020-10-12

## 2020-09-15 ENCOUNTER — PATIENT MESSAGE (OUTPATIENT)
Dept: HEALTH INFORMATION MANAGEMENT | Facility: OTHER | Age: 40
End: 2020-09-15

## 2020-09-22 ENCOUNTER — HOSPITAL ENCOUNTER (OUTPATIENT)
Dept: RADIOLOGY | Facility: MEDICAL CENTER | Age: 40
End: 2020-09-22
Attending: OBSTETRICS & GYNECOLOGY
Payer: COMMERCIAL

## 2020-09-22 DIAGNOSIS — N60.01 SOLITARY BENIGN CYST OF RIGHT BREAST: ICD-10-CM

## 2020-09-22 PROCEDURE — 76642 ULTRASOUND BREAST LIMITED: CPT | Mod: RT

## 2020-09-22 PROCEDURE — G0279 TOMOSYNTHESIS, MAMMO: HCPCS

## 2021-01-21 ENCOUNTER — TELEMEDICINE (OUTPATIENT)
Dept: MEDICAL GROUP | Age: 41
End: 2021-01-21
Payer: COMMERCIAL

## 2021-01-21 VITALS — TEMPERATURE: 49.4 F | BODY MASS INDEX: 21.69 KG/M2 | RESPIRATION RATE: 12 BRPM | HEIGHT: 66 IN | WEIGHT: 135 LBS

## 2021-01-21 DIAGNOSIS — F41.9 ANXIETY: ICD-10-CM

## 2021-01-21 DIAGNOSIS — G43.109 MIGRAINE WITH AURA AND WITHOUT STATUS MIGRAINOSUS, NOT INTRACTABLE: ICD-10-CM

## 2021-01-21 DIAGNOSIS — M54.2 CERVICAL PAIN (NECK): ICD-10-CM

## 2021-01-21 DIAGNOSIS — F51.01 PRIMARY INSOMNIA: ICD-10-CM

## 2021-01-21 DIAGNOSIS — Z00.00 BLOOD TESTS FOR ROUTINE GENERAL PHYSICAL EXAMINATION: ICD-10-CM

## 2021-01-21 PROCEDURE — 99214 OFFICE O/P EST MOD 30 MIN: CPT | Performed by: PHYSICIAN ASSISTANT

## 2021-01-21 RX ORDER — ALPRAZOLAM 0.5 MG/1
0.5 TABLET ORAL NIGHTLY PRN
Qty: 30 TAB | Refills: 0 | Status: SHIPPED | OUTPATIENT
Start: 2021-01-21 | End: 2021-02-05

## 2021-01-21 ASSESSMENT — PATIENT HEALTH QUESTIONNAIRE - PHQ9: CLINICAL INTERPRETATION OF PHQ2 SCORE: 0

## 2021-01-21 NOTE — ASSESSMENT & PLAN NOTE
She has history of migraines.  She is currently using Imitrex for abortive therapy, also on progesterone.  Prescribed by her gynecologist.  She states that with the addition of progesterone this is significantly helped with her migraines.  She was having them daily, now she has migraines 5-6 times a month which is a good improvement for her.  Imitrex works well for abortive therapies.  She has been tried on multiple maintenance medications in the past with no improvement.  Including gabapentin, amitriptyline, propanolol.

## 2021-01-21 NOTE — PROGRESS NOTES
Virtual Visit: Established Patient   This visit was conducted via Zoom using secure and encrypted videoconferencing technology. The patient was in a private location in the state of [place].    The patient's identity was confirmed and verbal consent was obtained for this virtual visit.    Subjective:   CC:   Chief Complaint   Patient presents with   • Headache   • Back Pain   • [facility] Care       Gem Grubbs is a 40 y.o. female presenting to [facility].  Is been about a year since she has been seen by primary care provider.  She is followed by gynecology and is up-to-date on her Pap.    Migraine with aura and without status migrainosus, not intractable  She has history of migraines.  She is currently using Imitrex for abortive therapy, also on progesterone.  Prescribed by her gynecologist.  She states that with the addition of progesterone this is significantly helped with her migraines.  She was having them daily, now she has migraines 5-6 times a month which is a good improvement for her.  Imitrex works well for abortive therapies.  She has been tried on multiple maintenance medications in the past with no improvement.  Including gabapentin, amitriptyline, propanolol.    Anxiety  She has intermittent anxiety, this mostly occurs at night only.  She has been tried on multiple sleep aids that are not effective.  Xanax works well for her.  She wakes up maybe 5 times a month with moderate panic.  Uses the Xanax which helps her go back to sleep.  Normally if she has these and does not have Xanax this will spur a migraine.  She is requesting new prescription for Xanax.  Denies SI or HI.    Cervical pain  For about the past eighth months she has had remittent neck pain.  She states initially she was having shooting pains that radiated down the back of her neck to her left shoulder.  This has improved.  She mostly notes tenderness to palpation along the base of her skull.  She does note pain with rotation.   "She she has tried gentle stretching, ice, heat, ibuprofen with only minimal response symptoms.  Denies weakness or injury.    ROS   Denies any recent fevers or chills. No nausea or vomiting. No chest pains or shortness of breath.     Allergies   Allergen Reactions   • Codeine Itching       Current medicines (including changes today)  Current Outpatient Medications   Medication Sig Dispense Refill   • progesterone (PROMETRIUM) 200 MG capsule      • ALPRAZolam (XANAX) 0.5 MG Tab Take 1 Tab by mouth at bedtime as needed for Sleep for up to 15 days. 30 Tab 0   • sumatriptan (IMITREX) 100 MG tablet TAKE 1 TAB BY MOUTH ONCE AS NEEDED FOR MIGRAINE FOR UP TO 1 DOSE 9 Tab 5     No current facility-administered medications for this visit.        Patient Active Problem List    Diagnosis Date Noted   • Migraine with aura and without status migrainosus, not intractable 08/10/2015   • Anxiety 08/10/2015   • Insomnia 08/10/2015       Family History   Problem Relation Age of Onset   • Heart Disease Mother         CHF   • Stroke Father    • No Known Problems Sister    • No Known Problems Brother        She  has a past medical history of Headache, classical migraine.  She  has a past surgical history that includes tonsillectomy and US-CYST ASPIRATION-BREAST INITIAL (Right, 2018).       Objective:   Temp (!) 9.7 °C (49.4 °F) (Temporal)   Resp 12   Ht 1.676 m (5' 6\")   Wt 61.2 kg (135 lb)   LMP 01/16/2021 (Approximate)   Breastfeeding No   BMI 21.79 kg/m²     Physical Exam:  Constitutional: Alert, no distress, well-groomed.  Skin: No rashes in visible areas.  Eye: Round. Conjunctiva clear, lids normal. No icterus.   ENMT: Lips pink without lesions, good dentition, moist mucous membranes. Phonation normal.  Neck: No masses, no thyromegaly.  Pain elicited with lateral rotation.  Asked to palpate cervical spinal processes-reports no pain.  Respiratory: Unlabored respiratory effort, no cough or audible wheeze  Psych: Alert and " oriented x3, normal affect and mood.       Assessment and Plan:   The following treatment plan was discussed:     1. Migraine with aura and without status migrainosus, not intractable  -Controlled.  Continue progesterone as prescribed by gynecologist and Imitrex as needed for abortive therapy.    2. Anxiety  -Has intermittent anxiety, mostly at night.  Given Xanax.    3. Primary insomnia  -She has tried multiple sleep aids with minimal improvement.  Xanax is most effective for her.  She states she only uses this handful times per month.  Will continue on Xanax.  Discussed if she finds her self taking this daily we will have to discuss maintenance medication for anxiety.  She is agreeable  - ALPRAZolam (XANAX) 0.5 MG Tab; Take 1 Tab by mouth at bedtime as needed for Sleep for up to 15 days.  Dispense: 30 Tab; Refill: 0    4. Cervical pain (neck)  -Difficult to evaluate through virtual visit.  However her symptoms have been ongoing for over 8 months will obtain cervical x-ray and place referral to PT.  If she has minimal improvement after completion of the physical therapy will consider additional imaging and referral to neurosurgery.  - DX-CERVICAL SPINE-2 OR 3 VIEWS; Future  - REFERRAL TO PHYSICAL THERAPY    5. Blood tests for routine general physical examination  - CBC WITH DIFFERENTIAL; Future  - Comp Metabolic Panel; Future  - Lipid Profile; Future  - TSH WITH REFLEX TO FT4; Future            Follow-up: Return in about 4 weeks (around 2/18/2021) for Annual PX, Lab Review.

## 2021-01-21 NOTE — ASSESSMENT & PLAN NOTE
She has intermittent anxiety, this mostly occurs at night only.  She has been tried on multiple sleep aids that are not effective.  Xanax works well for her.  She wakes up maybe 5 times a month with moderate panic.  Uses the Xanax which helps her go back to sleep.  Normally if she has these and does not have Xanax this will spur a migraine.  She is requesting new prescription for Xanax.  Denies SI or HI.

## 2021-01-27 ENCOUNTER — APPOINTMENT (OUTPATIENT)
Dept: PHYSICAL THERAPY | Facility: MEDICAL CENTER | Age: 41
End: 2021-01-27
Attending: PHYSICIAN ASSISTANT
Payer: COMMERCIAL

## 2021-02-01 ENCOUNTER — APPOINTMENT (OUTPATIENT)
Dept: PHYSICAL THERAPY | Facility: MEDICAL CENTER | Age: 41
End: 2021-02-01
Payer: COMMERCIAL

## 2021-02-03 ENCOUNTER — APPOINTMENT (OUTPATIENT)
Dept: PHYSICAL THERAPY | Facility: MEDICAL CENTER | Age: 41
End: 2021-02-03
Payer: COMMERCIAL

## 2021-02-03 ENCOUNTER — APPOINTMENT (OUTPATIENT)
Dept: PHYSICAL THERAPY | Facility: MEDICAL CENTER | Age: 41
End: 2021-02-03
Attending: PHYSICIAN ASSISTANT
Payer: COMMERCIAL

## 2021-02-05 ENCOUNTER — APPOINTMENT (OUTPATIENT)
Dept: PHYSICAL THERAPY | Facility: MEDICAL CENTER | Age: 41
End: 2021-02-05
Payer: COMMERCIAL

## 2021-02-08 ENCOUNTER — APPOINTMENT (OUTPATIENT)
Dept: PHYSICAL THERAPY | Facility: MEDICAL CENTER | Age: 41
End: 2021-02-08
Payer: COMMERCIAL

## 2021-02-10 ENCOUNTER — APPOINTMENT (OUTPATIENT)
Dept: PHYSICAL THERAPY | Facility: MEDICAL CENTER | Age: 41
End: 2021-02-10
Payer: COMMERCIAL

## 2021-02-11 ENCOUNTER — APPOINTMENT (OUTPATIENT)
Dept: PHYSICAL THERAPY | Facility: MEDICAL CENTER | Age: 41
End: 2021-02-11
Payer: COMMERCIAL

## 2021-02-15 ENCOUNTER — APPOINTMENT (OUTPATIENT)
Dept: PHYSICAL THERAPY | Facility: MEDICAL CENTER | Age: 41
End: 2021-02-15
Payer: COMMERCIAL

## 2021-02-18 ENCOUNTER — APPOINTMENT (OUTPATIENT)
Dept: PHYSICAL THERAPY | Facility: MEDICAL CENTER | Age: 41
End: 2021-02-18
Payer: COMMERCIAL

## 2021-02-22 ENCOUNTER — APPOINTMENT (OUTPATIENT)
Dept: PHYSICAL THERAPY | Facility: MEDICAL CENTER | Age: 41
End: 2021-02-22
Payer: COMMERCIAL

## 2021-02-25 ENCOUNTER — APPOINTMENT (OUTPATIENT)
Dept: PHYSICAL THERAPY | Facility: MEDICAL CENTER | Age: 41
End: 2021-02-25
Payer: COMMERCIAL

## 2021-03-11 ENCOUNTER — HOSPITAL ENCOUNTER (OUTPATIENT)
Dept: RADIOLOGY | Facility: MEDICAL CENTER | Age: 41
End: 2021-03-11
Attending: PHYSICIAN ASSISTANT
Payer: COMMERCIAL

## 2021-03-11 ENCOUNTER — HOSPITAL ENCOUNTER (OUTPATIENT)
Dept: LAB | Facility: MEDICAL CENTER | Age: 41
End: 2021-03-11
Attending: PHYSICIAN ASSISTANT
Payer: COMMERCIAL

## 2021-03-11 DIAGNOSIS — Z00.00 BLOOD TESTS FOR ROUTINE GENERAL PHYSICAL EXAMINATION: ICD-10-CM

## 2021-03-11 DIAGNOSIS — M54.2 CERVICAL PAIN (NECK): ICD-10-CM

## 2021-03-11 LAB
ALBUMIN SERPL BCP-MCNC: 4.3 G/DL (ref 3.2–4.9)
ALBUMIN/GLOB SERPL: 1.4 G/DL
ALP SERPL-CCNC: 59 U/L (ref 30–99)
ALT SERPL-CCNC: 10 U/L (ref 2–50)
ANION GAP SERPL CALC-SCNC: 9 MMOL/L (ref 7–16)
AST SERPL-CCNC: 10 U/L (ref 12–45)
BASOPHILS # BLD AUTO: 0.4 % (ref 0–1.8)
BASOPHILS # BLD: 0.03 K/UL (ref 0–0.12)
BILIRUB SERPL-MCNC: 0.5 MG/DL (ref 0.1–1.5)
BUN SERPL-MCNC: 14 MG/DL (ref 8–22)
CALCIUM SERPL-MCNC: 9.4 MG/DL (ref 8.5–10.5)
CHLORIDE SERPL-SCNC: 105 MMOL/L (ref 96–112)
CHOLEST SERPL-MCNC: 178 MG/DL (ref 100–199)
CO2 SERPL-SCNC: 24 MMOL/L (ref 20–33)
CREAT SERPL-MCNC: 0.8 MG/DL (ref 0.5–1.4)
EOSINOPHIL # BLD AUTO: 0.05 K/UL (ref 0–0.51)
EOSINOPHIL NFR BLD: 0.7 % (ref 0–6.9)
ERYTHROCYTE [DISTWIDTH] IN BLOOD BY AUTOMATED COUNT: 42.3 FL (ref 35.9–50)
FASTING STATUS PATIENT QL REPORTED: NORMAL
GLOBULIN SER CALC-MCNC: 3 G/DL (ref 1.9–3.5)
GLUCOSE SERPL-MCNC: 91 MG/DL (ref 65–99)
HCT VFR BLD AUTO: 43.6 % (ref 37–47)
HDLC SERPL-MCNC: 49 MG/DL
HGB BLD-MCNC: 14.3 G/DL (ref 12–16)
IMM GRANULOCYTES # BLD AUTO: 0.02 K/UL (ref 0–0.11)
IMM GRANULOCYTES NFR BLD AUTO: 0.3 % (ref 0–0.9)
LDLC SERPL CALC-MCNC: 115 MG/DL
LYMPHOCYTES # BLD AUTO: 1.95 K/UL (ref 1–4.8)
LYMPHOCYTES NFR BLD: 28.7 % (ref 22–41)
MCH RBC QN AUTO: 29.4 PG (ref 27–33)
MCHC RBC AUTO-ENTMCNC: 32.8 G/DL (ref 33.6–35)
MCV RBC AUTO: 89.5 FL (ref 81.4–97.8)
MONOCYTES # BLD AUTO: 0.48 K/UL (ref 0–0.85)
MONOCYTES NFR BLD AUTO: 7.1 % (ref 0–13.4)
NEUTROPHILS # BLD AUTO: 4.26 K/UL (ref 2–7.15)
NEUTROPHILS NFR BLD: 62.8 % (ref 44–72)
NRBC # BLD AUTO: 0 K/UL
NRBC BLD-RTO: 0 /100 WBC
PLATELET # BLD AUTO: 250 K/UL (ref 164–446)
PMV BLD AUTO: 11 FL (ref 9–12.9)
POTASSIUM SERPL-SCNC: 3.9 MMOL/L (ref 3.6–5.5)
PROT SERPL-MCNC: 7.3 G/DL (ref 6–8.2)
RBC # BLD AUTO: 4.87 M/UL (ref 4.2–5.4)
SODIUM SERPL-SCNC: 138 MMOL/L (ref 135–145)
TRIGL SERPL-MCNC: 70 MG/DL (ref 0–149)
TSH SERPL DL<=0.005 MIU/L-ACNC: 1.54 UIU/ML (ref 0.38–5.33)
WBC # BLD AUTO: 6.8 K/UL (ref 4.8–10.8)

## 2021-03-11 PROCEDURE — 36415 COLL VENOUS BLD VENIPUNCTURE: CPT

## 2021-03-11 PROCEDURE — 84443 ASSAY THYROID STIM HORMONE: CPT

## 2021-03-11 PROCEDURE — 80061 LIPID PANEL: CPT

## 2021-03-11 PROCEDURE — 80053 COMPREHEN METABOLIC PANEL: CPT

## 2021-03-11 PROCEDURE — 85025 COMPLETE CBC W/AUTO DIFF WBC: CPT

## 2021-03-11 PROCEDURE — 72040 X-RAY EXAM NECK SPINE 2-3 VW: CPT

## 2021-04-06 ENCOUNTER — IMMUNIZATION (OUTPATIENT)
Dept: FAMILY PLANNING/WOMEN'S HEALTH CLINIC | Facility: IMMUNIZATION CENTER | Age: 41
End: 2021-04-06
Payer: COMMERCIAL

## 2021-04-06 DIAGNOSIS — Z23 ENCOUNTER FOR VACCINATION: Primary | ICD-10-CM

## 2021-04-06 PROCEDURE — 91300 PFIZER SARS-COV-2 VACCINE: CPT

## 2021-04-06 PROCEDURE — 0001A PFIZER SARS-COV-2 VACCINE: CPT

## 2021-04-29 ENCOUNTER — IMMUNIZATION (OUTPATIENT)
Dept: FAMILY PLANNING/WOMEN'S HEALTH CLINIC | Facility: IMMUNIZATION CENTER | Age: 41
End: 2021-04-29
Payer: COMMERCIAL

## 2021-04-29 DIAGNOSIS — Z23 ENCOUNTER FOR VACCINATION: Primary | ICD-10-CM

## 2021-04-29 PROCEDURE — 0002A PFIZER SARS-COV-2 VACCINE: CPT

## 2021-04-29 PROCEDURE — 91300 PFIZER SARS-COV-2 VACCINE: CPT

## 2021-05-24 ENCOUNTER — TELEPHONE (OUTPATIENT)
Dept: PHYSICAL THERAPY | Facility: MEDICAL CENTER | Age: 41
End: 2021-05-24

## 2021-05-24 NOTE — OP THERAPY DISCHARGE SUMMARY
Outpatient Physical Therapy  DISCHARGE SUMMARY NOTE      [facility]  [address]  [place] [address]  Phone:  [phone]  Fax:  [phone]    Date of Visit: 05/24/2021    Patient: Gem Grubbs  YOB: 1980  MRN: [id number]     Referring Provider: No referring provider defined for this encounter.   Referring Diagnosis No admission diagnoses are documented for this encounter.         Functional Assessment Used        Your patient is being discharged from Physical Therapy with the following comments:   · Patient has failed to schedule or reschedule follow-up visits    Comments:  Patient with referral Jan 2021 but no evaluation scheduled or attended. Will close chart      Limitations Remaining:  unknown    Recommendations:  Follow up with primary care as needed    Juancho Murphy, PT, DPT    Date: 5/24/2021

## 2021-11-14 DIAGNOSIS — G43.109 MIGRAINE WITH AURA AND WITHOUT STATUS MIGRAINOSUS, NOT INTRACTABLE: ICD-10-CM

## 2021-11-16 RX ORDER — SUMATRIPTAN 100 MG/1
TABLET, FILM COATED ORAL
Qty: 9 TABLET | Refills: 3 | Status: SHIPPED | OUTPATIENT
Start: 2021-11-16 | End: 2022-12-20

## 2021-11-16 NOTE — TELEPHONE ENCOUNTER
Phone Number Called: [phone]    Call outcome: Spoke to patient regarding message below.    Message: Scheduled for 12/9 at 8 am

## 2022-01-07 ENCOUNTER — HOSPITAL ENCOUNTER (OUTPATIENT)
Dept: RADIOLOGY | Facility: MEDICAL CENTER | Age: 42
End: 2022-01-07
Attending: OBSTETRICS & GYNECOLOGY
Payer: COMMERCIAL

## 2022-01-07 DIAGNOSIS — Z12.31 ENCOUNTER FOR MAMMOGRAM TO ESTABLISH BASELINE MAMMOGRAM: ICD-10-CM

## 2022-01-07 PROCEDURE — 77063 BREAST TOMOSYNTHESIS BI: CPT

## 2022-03-17 ENCOUNTER — OFFICE VISIT (OUTPATIENT)
Dept: MEDICAL GROUP | Age: 42
End: 2022-03-17
Payer: COMMERCIAL

## 2022-03-17 VITALS
OXYGEN SATURATION: 100 % | HEIGHT: 66 IN | RESPIRATION RATE: 14 BRPM | WEIGHT: 141.4 LBS | BODY MASS INDEX: 22.73 KG/M2 | SYSTOLIC BLOOD PRESSURE: 104 MMHG | HEART RATE: 68 BPM | TEMPERATURE: 99.2 F | DIASTOLIC BLOOD PRESSURE: 66 MMHG

## 2022-03-17 DIAGNOSIS — M25.562 CHRONIC PAIN OF LEFT KNEE: ICD-10-CM

## 2022-03-17 DIAGNOSIS — R05.3 CHRONIC COUGH: ICD-10-CM

## 2022-03-17 DIAGNOSIS — G89.29 CHRONIC PAIN OF LEFT KNEE: ICD-10-CM

## 2022-03-17 DIAGNOSIS — J01.01 ACUTE RECURRENT MAXILLARY SINUSITIS: ICD-10-CM

## 2022-03-17 DIAGNOSIS — J30.9 CHRONIC ALLERGIC RHINITIS: ICD-10-CM

## 2022-03-17 PROCEDURE — 99214 OFFICE O/P EST MOD 30 MIN: CPT | Performed by: INTERNAL MEDICINE

## 2022-03-17 RX ORDER — TRIAMCINOLONE ACETONIDE 40 MG/ML
40 INJECTION, SUSPENSION INTRA-ARTICULAR; INTRAMUSCULAR ONCE
Status: COMPLETED | OUTPATIENT
Start: 2022-03-17 | End: 2022-03-17

## 2022-03-17 RX ORDER — AZITHROMYCIN 250 MG/1
TABLET, FILM COATED ORAL
Qty: 6 TABLET | Refills: 1 | Status: SHIPPED | OUTPATIENT
Start: 2022-03-17 | End: 2022-08-29

## 2022-03-17 RX ADMIN — TRIAMCINOLONE ACETONIDE 40 MG: 40 INJECTION, SUSPENSION INTRA-ARTICULAR; INTRAMUSCULAR at 15:51

## 2022-03-17 ASSESSMENT — ENCOUNTER SYMPTOMS
CARDIOVASCULAR NEGATIVE: 1
PSYCHIATRIC NEGATIVE: 1
COUGH: 1
CONSTITUTIONAL NEGATIVE: 1
NEUROLOGICAL NEGATIVE: 1
GASTROINTESTINAL NEGATIVE: 1
EYES NEGATIVE: 1

## 2022-03-17 ASSESSMENT — FIBROSIS 4 INDEX: FIB4 SCORE: 0.52

## 2022-03-17 NOTE — LETTER
[facility]  Giuseppe See M.D.  [address]  [place] [address]  Fax: [phone]   Authorization for Release/Disclosure of   Protected Health Information   Name: PEDRO SIMONS : 1980 SSN: [id number]   Address: [address] [place] [address]  [place] [address] Phone:    [phone] (home)    I authorize the entity listed below to release/disclose the PHI below to:   [facility]/Giuseppe See M.D. and Giuseppe See M.D.   Provider or Entity Name[facility]  [facility]       Address   [facility], [facility], [facility]   Phone:      Fax:     Reason for request: continuity of care   Information to be released:    [  ] LAST COLONOSCOPY,  including any PATH REPORT and follow-up  [  ] LAST FIT/COLOGUARD RESULT [  ] LAST DEXA  [  ] LAST MAMMOGRAM  [  ] LAST PAP  [  ] LAST LABS [  ] RETINA EXAM REPORT  [  ] IMMUNIZATION RECORDS  [  ] Release all info      [  ] Check here and initial the line next to each item to release ALL health information INCLUDING  _____ Care and treatment for drug and / or alcohol abuse  _____ HIV testing, infection status, or AIDS  _____ Genetic Testing    DATES OF SERVICE OR TIME PERIOD TO BE DISCLOSED: _____________  I understand and acknowledge that:  * This Authorization may be revoked at any time by you in writing, except if your health information has already been used or disclosed.  * Your health information that will be used or disclosed as a result of you signing this authorization could be re-disclosed by the recipient. If this occurs, your re-disclosed health information may no longer be protected by State or Federal laws.  * You may refuse to sign this Authorization. Your refusal will not affect your ability to obtain treatment.  * This Authorization becomes effective upon signing and will  on (date) __________.      If no date is indicated, this Authorization will  one (1) year from the signature date.    Name: Pedro Simons    Signature:   Date:      3/17/2022       PLEASE FAX REQUESTED RECORDS BACK TO: [phone]

## 2022-03-18 NOTE — PROGRESS NOTES
Subjective     Gem Grubbs is a 41 y.o. female who presents with Knee Pain (X 4 MONTHS/) and Cough (TOOTH PAIN , NECK PAIN , NASAL )  The patient is here for followup of chronic medical problems listed below. The patient is compliant with medications and having no side effects from them. Denies chest pain, abdominal pain, dyspnea, myalgias, or cough.   Patient Active Problem List    Diagnosis Date Noted   • Migraine with aura and without status migrainosus, not intractable 08/10/2015   • Anxiety 08/10/2015   • Insomnia 08/10/2015     Allergies   Allergen Reactions   • Codeine Itching     Outpatient Medications Prior to Visit   Medication Sig Dispense Refill   • sumatriptan (IMITREX) 100 MG tablet TAKE 1 TABLET BY MOUTH ONCE AS NEEDED FOR MIGRAINE FOR UP TO 1 DOSE 9 Tablet 3   • progesterone (PROMETRIUM) 200 MG capsule        No facility-administered medications prior to visit.     No visits with results within 1 Month(s) from this visit.   Latest known visit with results is:   Hospital Outpatient Visit on 03/11/2021   Component Date Value   • TSH 03/11/2021 1.540    • Cholesterol,Tot 03/11/2021 178    • Triglycerides 03/11/2021 70    • HDL 03/11/2021 49    • LDL 03/11/2021 115 (A)   • Sodium 03/11/2021 138    • Potassium 03/11/2021 3.9    • Chloride 03/11/2021 105    • Co2 03/11/2021 24    • Anion Gap 03/11/2021 9.0    • Glucose 03/11/2021 91    • Bun 03/11/2021 14    • Creatinine 03/11/2021 0.80    • Calcium 03/11/2021 9.4    • AST(SGOT) 03/11/2021 10 (A)   • ALT(SGPT) 03/11/2021 10    • Alkaline Phosphatase 03/11/2021 59    • Total Bilirubin 03/11/2021 0.5    • Albumin 03/11/2021 4.3    • Total Protein 03/11/2021 7.3    • Globulin 03/11/2021 3.0    • A-G Ratio 03/11/2021 1.4    • WBC 03/11/2021 6.8    • RBC 03/11/2021 4.87    • Hemoglobin 03/11/2021 14.3    • Hematocrit 03/11/2021 43.6    • MCV 03/11/2021 89.5    • MCH 03/11/2021 29.4    • MCHC 03/11/2021 32.8 (A)   • RDW 03/11/2021 42.3    • Platelet  Count 03/11/2021 250    • MPV 03/11/2021 11.0    • Neutrophils-Polys 03/11/2021 62.80    • Lymphocytes 03/11/2021 28.70    • Monocytes 03/11/2021 7.10    • Eosinophils 03/11/2021 0.70    • Basophils 03/11/2021 0.40    • Immature Granulocytes 03/11/2021 0.30    • Nucleated RBC 03/11/2021 0.00    • Neutrophils (Absolute) 03/11/2021 4.26    • Lymphs (Absolute) 03/11/2021 1.95    • Monos (Absolute) 03/11/2021 0.48    • Eos (Absolute) 03/11/2021 0.05    • Baso (Absolute) 03/11/2021 0.03    • Immature Granulocytes (a* 03/11/2021 0.02    • NRBC (Absolute) 03/11/2021 0.00    • Fasting Status 03/11/2021 Fasting    • GFR If  03/11/2021 >60    • GFR If Non  [employer]meri* 03/11/2021 >60       No results found for: HBA1C  Lab Results   Component Value Date/Time    SODIUM 138 03/11/2021 08:24 AM    POTASSIUM 3.9 03/11/2021 08:24 AM    CHLORIDE 105 03/11/2021 08:24 AM    CO2 24 03/11/2021 08:24 AM    GLUCOSE 91 03/11/2021 08:24 AM    BUN 14 03/11/2021 08:24 AM    CREATININE 0.80 03/11/2021 08:24 AM    ALKPHOSPHAT 59 03/11/2021 08:24 AM    ASTSGOT 10 (L) 03/11/2021 08:24 AM    ALTSGPT 10 03/11/2021 08:24 AM    TBILIRUBIN 0.5 03/11/2021 08:24 AM     No results found for: INR  Lab Results   Component Value Date/Time    CHOLSTRLTOT 178 03/11/2021 08:24 AM     (H) 03/11/2021 08:24 AM    HDL 49 03/11/2021 08:24 AM    TRIGLYCERIDE 70 03/11/2021 08:24 AM       No results found for: TESTOSTERONE  No results found for: TSH  Lab Results   Component Value Date/Time    FREET4 1.02 09/11/2014 02:41 PM     No results found for: URICACID  No components found for: VITB12  Lab Results   Component Value Date/Time    25HYDROXY 44 09/11/2014 02:41 PM   '            HPI    Review of Systems   Constitutional: Negative.    HENT: Positive for congestion.    Eyes: Negative.    Respiratory: Positive for cough.    Cardiovascular: Negative.    Gastrointestinal: Negative.    Genitourinary: Negative.    Musculoskeletal: Positive for joint  "pain.   Skin: Negative.    Neurological: Negative.    Endo/Heme/Allergies: Negative.    Psychiatric/Behavioral: Negative.               Objective     /66 (BP Location: Left arm, Patient Position: Sitting, BP Cuff Size: Adult)   Pulse 68   Temp 37.3 °C (99.2 °F)   Resp 14   Ht 1.676 m (5' 6\")   Wt 64.1 kg (141 lb 6.4 oz)   SpO2 100%   BMI 22.82 kg/m²      Physical Exam  Vitals reviewed.   Constitutional:       General: She is not in acute distress.     Appearance: She is well-developed. She is not diaphoretic.   HENT:      Head: Normocephalic and atraumatic.      Right Ear: External ear normal.      Left Ear: External ear normal.      Nose: Nose normal.      Mouth/Throat:      Pharynx: No oropharyngeal exudate.   Eyes:      General: No scleral icterus.        Right eye: No discharge.         Left eye: No discharge.      Conjunctiva/sclera: Conjunctivae normal.      Pupils: Pupils are equal, round, and reactive to light.   Neck:      Thyroid: No thyromegaly.      Vascular: No JVD.      Trachea: No tracheal deviation.   Cardiovascular:      Rate and Rhythm: Normal rate and regular rhythm.      Heart sounds: Normal heart sounds. No murmur heard.    No friction rub. No gallop.   Pulmonary:      Effort: Pulmonary effort is normal. No respiratory distress.      Breath sounds: Normal breath sounds. No stridor. No wheezing or rales.   Chest:      Chest wall: No tenderness.   Abdominal:      General: Bowel sounds are normal. There is no distension.      Palpations: Abdomen is soft. There is no mass.      Tenderness: There is no abdominal tenderness. There is no guarding or rebound.   Musculoskeletal:         General: Tenderness present. Normal range of motion.      Cervical back: Normal range of motion and neck supple.      Comments: There is mild to moderate trapezius muscle tenderness and spasm bilaterally in the mid and upper portions.    Left knee shows full range of motion with mild tenderness posterior " laterally and palpable crepitance on passive range of motion and possible click.  Good joint stability.  No effusion.  No joint line tenderness.   Lymphadenopathy:      Cervical: No cervical adenopathy.   Skin:     General: Skin is warm and dry.      Coloration: Skin is not pale.      Findings: No erythema or rash.   Neurological:      Mental Status: She is alert and oriented to person, place, and time.      Cranial Nerves: No cranial nerve deficit.      Motor: No abnormal muscle tone.      Coordination: Coordination normal.      Deep Tendon Reflexes: Reflexes are normal and symmetric. Reflexes normal.   Psychiatric:         Behavior: Behavior normal.         Thought Content: Thought content normal.         Judgment: Judgment normal.                             Assessment & Plan        1. Chronic pain of left knee  Suspect possible ligamentous sprain or cartilage tear is slowly improving over the last 3 months.  Refer to orthopedics for further evaluation.  X-ray ordered to rule out any bony pathology.  Advised on rest ice elevation compression and NSAIDs as needed.  If symptoms fail to resolve in 3 months then MRI would be appropriate to look for internal derangement.  - Referral to Orthopedics  - DX-KNEE 3 VIEWS LEFT; Future    2. Chronic cough  Unclear etiology.  Probably secondary to postnasal drip.  Flonase nasal spray will be continued.  Kenalog injection provided.   - DX-CHEST-2 VIEWS; Future    3. Chronic allergic rhinitis  Trial of Astelin.  Continue with Flonase as it seems to be helping.  Kenalog injection.  - Referral to ENT  - triamcinolone acetonide (KENALOG-40) injection 40 mg    4. Acute recurrent maxillary sinusitis      Rule out possible sinusitis bacterial infection.  Trial of Z-Jason with 1 refill.  - azithromycin (ZITHROMAX) 250 MG Tab; Take 2 tabs today then 1 per day for 4 days  Dispense: 6 Tablet; Refill: 1

## 2022-08-29 ENCOUNTER — OFFICE VISIT (OUTPATIENT)
Dept: MEDICAL GROUP | Age: 42
End: 2022-08-29
Payer: COMMERCIAL

## 2022-08-29 VITALS
TEMPERATURE: 99.9 F | SYSTOLIC BLOOD PRESSURE: 110 MMHG | WEIGHT: 139 LBS | DIASTOLIC BLOOD PRESSURE: 62 MMHG | RESPIRATION RATE: 16 BRPM | BODY MASS INDEX: 22.34 KG/M2 | HEART RATE: 85 BPM | OXYGEN SATURATION: 99 % | HEIGHT: 66 IN

## 2022-08-29 DIAGNOSIS — R22.1 LOCALIZED SWELLING, MASS OR LUMP OF NECK: ICD-10-CM

## 2022-08-29 DIAGNOSIS — M54.2 CERVICAL PAIN (NECK): ICD-10-CM

## 2022-08-29 DIAGNOSIS — M54.2 CERVICALGIA: ICD-10-CM

## 2022-08-29 PROCEDURE — 99214 OFFICE O/P EST MOD 30 MIN: CPT | Performed by: INTERNAL MEDICINE

## 2022-08-29 RX ORDER — TIZANIDINE 4 MG/1
4 TABLET ORAL EVERY 6 HOURS PRN
Qty: 30 TABLET | Refills: 3 | Status: SHIPPED | OUTPATIENT
Start: 2022-08-29 | End: 2024-01-15 | Stop reason: SDUPTHER

## 2022-08-29 RX ORDER — MELOXICAM 15 MG/1
15 TABLET ORAL DAILY
Qty: 30 TABLET | Refills: 1 | Status: SHIPPED | OUTPATIENT
Start: 2022-08-29 | End: 2022-08-30 | Stop reason: SDUPTHER

## 2022-08-29 RX ORDER — TRIAMCINOLONE ACETONIDE 40 MG/ML
40 INJECTION, SUSPENSION INTRA-ARTICULAR; INTRAMUSCULAR ONCE
Status: COMPLETED | OUTPATIENT
Start: 2022-08-29 | End: 2022-08-29

## 2022-08-29 RX ADMIN — Medication 2 ML: at 14:50

## 2022-08-29 RX ADMIN — TRIAMCINOLONE ACETONIDE 40 MG: 40 INJECTION, SUSPENSION INTRA-ARTICULAR; INTRAMUSCULAR at 14:49

## 2022-08-29 ASSESSMENT — ENCOUNTER SYMPTOMS
PSYCHIATRIC NEGATIVE: 1
CONSTITUTIONAL NEGATIVE: 1
EYES NEGATIVE: 1
GASTROINTESTINAL NEGATIVE: 1
NEUROLOGICAL NEGATIVE: 1
CARDIOVASCULAR NEGATIVE: 1
NECK PAIN: 1
RESPIRATORY NEGATIVE: 1

## 2022-08-29 ASSESSMENT — FIBROSIS 4 INDEX: FIB4 SCORE: 0.53

## 2022-08-29 ASSESSMENT — PATIENT HEALTH QUESTIONNAIRE - PHQ9: CLINICAL INTERPRETATION OF PHQ2 SCORE: 0

## 2022-08-30 DIAGNOSIS — M54.2 CERVICALGIA: ICD-10-CM

## 2022-08-30 RX ORDER — MELOXICAM 15 MG/1
15 TABLET ORAL DAILY
Qty: 30 TABLET | Refills: 1 | Status: SHIPPED | OUTPATIENT
Start: 2022-08-30 | End: 2024-01-15 | Stop reason: SDUPTHER

## 2022-08-30 NOTE — PROGRESS NOTES
"Subjective     Gem Grubbs is a 42 y.o. female who presents with Follow-Up (Neck pain since last year )  The patient presents with progressively worsening posterior neck pain and upper spinal back pain over the last 2 years but much worse in the last few months.  Has been no radicular symptoms.  She has noticed a swelling and lump on the back of her lower neck over the last few months as well.  Is been no fever chills night sweats pruritus or weight loss.  No nausea vomiting diarrhea abdominal pain or chest pain.  No history of trauma.  Patient has had daily pain in the neck which responds partially to OTC analgesics but not completely.  Feels that it cortisone injection might help as she got this a few years ago with neck pain with some relief.  She is digressed with migraines about 8 to 10 months ago and prescribed sumatriptan which helped the headaches but not the neck pain.          HPI    Review of Systems   Constitutional: Negative.    HENT: Negative.     Eyes: Negative.    Respiratory: Negative.     Cardiovascular: Negative.    Gastrointestinal: Negative.    Genitourinary: Negative.    Musculoskeletal:  Positive for neck pain.   Skin: Negative.    Neurological: Negative.    Endo/Heme/Allergies: Negative.    Psychiatric/Behavioral: Negative.              Objective     /62 (BP Location: Left arm, Patient Position: Sitting, BP Cuff Size: Adult)   Pulse 85   Temp 37.7 °C (99.9 °F) (Temporal)   Resp 16   Ht 1.676 m (5' 6\")   Wt 63 kg (139 lb)   SpO2 99%   BMI 22.44 kg/m²      Physical Exam  Vitals reviewed.   Constitutional:       General: She is not in acute distress.     Appearance: She is well-developed. She is not diaphoretic.   HENT:      Head: Normocephalic and atraumatic.      Right Ear: External ear normal.      Left Ear: External ear normal.      Nose: Nose normal.      Mouth/Throat:      Pharynx: No oropharyngeal exudate.   Eyes:      General: No scleral icterus.        Right eye: " No discharge.         Left eye: No discharge.      Conjunctiva/sclera: Conjunctivae normal.      Pupils: Pupils are equal, round, and reactive to light.   Neck:      Thyroid: No thyromegaly.      Vascular: No JVD.      Trachea: No tracheal deviation.   Cardiovascular:      Rate and Rhythm: Normal rate and regular rhythm.      Heart sounds: Normal heart sounds. No murmur heard.    No friction rub. No gallop.   Pulmonary:      Effort: Pulmonary effort is normal. No respiratory distress.      Breath sounds: Normal breath sounds. No stridor. No wheezing or rales.   Chest:      Chest wall: No tenderness.   Abdominal:      General: Bowel sounds are normal. There is no distension.      Palpations: Abdomen is soft. There is no mass.      Tenderness: There is no abdominal tenderness. There is no guarding or rebound.   Musculoskeletal:         General: Swelling and tenderness present. Normal range of motion.      Cervical back: Normal range of motion and neck supple.      Comments: Is a prominence and fullness to the midline lower neck and upper back area over a 5 x 10 cm area with overlying slight hint of erythema.  This area is slightly tender.  Originally thought was given to administer trigger point injection in this area paraspinally but because of the mass was felt best to leave this alone and obtain imaging first.  This could represent an underlying neoplasm.   Lymphadenopathy:      Cervical: No cervical adenopathy.   Skin:     General: Skin is warm and dry.      Coloration: Skin is not pale.      Findings: No erythema or rash.   Neurological:      Mental Status: She is alert and oriented to person, place, and time.      Cranial Nerves: No cranial nerve deficit.      Motor: No abnormal muscle tone.      Coordination: Coordination normal.      Deep Tendon Reflexes: Reflexes are normal and symmetric. Reflexes normal.   Psychiatric:         Behavior: Behavior normal.         Thought Content: Thought content normal.          Judgment: Judgment normal.     No visits with results within 1 Month(s) from this visit.   Latest known visit with results is:   Hospital Outpatient Visit on 03/11/2021   Component Date Value    TSH 03/11/2021 1.540     Cholesterol,Tot 03/11/2021 178     Triglycerides 03/11/2021 70     HDL 03/11/2021 49     LDL 03/11/2021 115 (A)    Sodium 03/11/2021 138     Potassium 03/11/2021 3.9     Chloride 03/11/2021 105     Co2 03/11/2021 24     Anion Gap 03/11/2021 9.0     Glucose 03/11/2021 91     Bun 03/11/2021 14     Creatinine 03/11/2021 0.80     Calcium 03/11/2021 9.4     AST(SGOT) 03/11/2021 10 (A)    ALT(SGPT) 03/11/2021 10     Alkaline Phosphatase 03/11/2021 59     Total Bilirubin 03/11/2021 0.5     Albumin 03/11/2021 4.3     Total Protein 03/11/2021 7.3     Globulin 03/11/2021 3.0     A-G Ratio 03/11/2021 1.4     WBC 03/11/2021 6.8     RBC 03/11/2021 4.87     Hemoglobin 03/11/2021 14.3     Hematocrit 03/11/2021 43.6     MCV 03/11/2021 89.5     MCH 03/11/2021 29.4     MCHC 03/11/2021 32.8 (A)    RDW 03/11/2021 42.3     Platelet Count 03/11/2021 250     MPV 03/11/2021 11.0     Neutrophils-Polys 03/11/2021 62.80     Lymphocytes 03/11/2021 28.70     Monocytes 03/11/2021 7.10     Eosinophils 03/11/2021 0.70     Basophils 03/11/2021 0.40     Immature Granulocytes 03/11/2021 0.30     Nucleated RBC 03/11/2021 0.00     Neutrophils (Absolute) 03/11/2021 4.26     Lymphs (Absolute) 03/11/2021 1.95     Monos (Absolute) 03/11/2021 0.48     Eos (Absolute) 03/11/2021 0.05     Baso (Absolute) 03/11/2021 0.03     Immature Granulocytes (a* 03/11/2021 0.02     NRBC (Absolute) 03/11/2021 0.00     Fasting Status 03/11/2021 Fasting     GFR If African American 03/11/2021 >60     GFR If Non  Ameri* 03/11/2021 >60       No results found for: HBA1C  Lab Results   Component Value Date/Time    SODIUM 138 03/11/2021 08:24 AM    POTASSIUM 3.9 03/11/2021 08:24 AM    CHLORIDE 105 03/11/2021 08:24 AM    CO2 24 03/11/2021 08:24 AM    GLUCOSE  91 03/11/2021 08:24 AM    BUN 14 03/11/2021 08:24 AM    CREATININE 0.80 03/11/2021 08:24 AM    ALKPHOSPHAT 59 03/11/2021 08:24 AM    ASTSGOT 10 (L) 03/11/2021 08:24 AM    ALTSGPT 10 03/11/2021 08:24 AM    TBILIRUBIN 0.5 03/11/2021 08:24 AM     No results found for: INR  Lab Results   Component Value Date/Time    CHOLSTRLTOT 178 03/11/2021 08:24 AM     (H) 03/11/2021 08:24 AM    HDL 49 03/11/2021 08:24 AM    TRIGLYCERIDE 70 03/11/2021 08:24 AM       No results found for: TESTOSTERONE  No results found for: TSH  Lab Results   Component Value Date/Time    FREET4 1.02 09/11/2014 02:41 PM     No results found for: URICACID  No components found for: VITB12  Lab Results   Component Value Date/Time    25HYDROXY 44 09/11/2014 02:41 PM   '                       Assessment & Plan        1. Cervicalgia        - MR-CERVICAL SPINE-W/O; Future  - Referral to Pain Clinic  - meloxicam (MOBIC) 15 MG tablet; Take 1 Tablet by mouth every day.  Dispense: 30 Tablet; Refill: 1  - tizanidine (ZANAFLEX) 4 MG Tab; Take 1 Tablet by mouth every 6 hours as needed (neck pain and spasms).  Dispense: 30 Tablet; Refill: 3  - Sed Rate; Future  - Comp Metabolic Panel; Future  - CBC WITH DIFFERENTIAL; Future  - URIC ACID; Future  - CCP ANTIBODY; Future  - RHEUMATOID ARTHRITIS FACTOR; Future  - TERESITA IGG MOIZ W/RFLX TO TERESITA IGG IFA; Future  - TSH; Future  - HLA-B27; Future    2. Cervical pain (neck)     - triamcinolone acetonide (KENALOG-40) injection 40 mg  - lidocaine (XYLOCAINE) 1 % injection 2 mL  - lidocaine (XYLOCAINE) 1 % injection 2 mL    3. Localized swelling, mass or lump of neck  Recheck in 2 weeks assess response to the above measures and review MRI results.

## 2022-08-31 ENCOUNTER — HOSPITAL ENCOUNTER (OUTPATIENT)
Dept: LAB | Facility: MEDICAL CENTER | Age: 42
End: 2022-08-31
Attending: INTERNAL MEDICINE
Payer: COMMERCIAL

## 2022-08-31 ENCOUNTER — APPOINTMENT (OUTPATIENT)
Dept: RADIOLOGY | Facility: MEDICAL CENTER | Age: 42
End: 2022-08-31
Attending: INTERNAL MEDICINE
Payer: COMMERCIAL

## 2022-08-31 DIAGNOSIS — M54.2 CERVICALGIA: ICD-10-CM

## 2022-08-31 LAB
ALBUMIN SERPL BCP-MCNC: 4.4 G/DL (ref 3.2–4.9)
ALBUMIN/GLOB SERPL: 1.7 G/DL
ALP SERPL-CCNC: 61 U/L (ref 30–99)
ALT SERPL-CCNC: 11 U/L (ref 2–50)
ANION GAP SERPL CALC-SCNC: 9 MMOL/L (ref 7–16)
AST SERPL-CCNC: 17 U/L (ref 12–45)
BASOPHILS # BLD AUTO: 0.8 % (ref 0–1.8)
BASOPHILS # BLD: 0.06 K/UL (ref 0–0.12)
BILIRUB SERPL-MCNC: 0.2 MG/DL (ref 0.1–1.5)
BUN SERPL-MCNC: 16 MG/DL (ref 8–22)
CALCIUM SERPL-MCNC: 9.2 MG/DL (ref 8.5–10.5)
CHLORIDE SERPL-SCNC: 107 MMOL/L (ref 96–112)
CO2 SERPL-SCNC: 24 MMOL/L (ref 20–33)
CREAT SERPL-MCNC: 0.77 MG/DL (ref 0.5–1.4)
EOSINOPHIL # BLD AUTO: 0.04 K/UL (ref 0–0.51)
EOSINOPHIL NFR BLD: 0.6 % (ref 0–6.9)
ERYTHROCYTE [DISTWIDTH] IN BLOOD BY AUTOMATED COUNT: 45 FL (ref 35.9–50)
ERYTHROCYTE [SEDIMENTATION RATE] IN BLOOD BY WESTERGREN METHOD: 5 MM/HOUR (ref 0–25)
FASTING STATUS PATIENT QL REPORTED: NORMAL
GFR SERPLBLD CREATININE-BSD FMLA CKD-EPI: 98 ML/MIN/1.73 M 2
GLOBULIN SER CALC-MCNC: 2.6 G/DL (ref 1.9–3.5)
GLUCOSE SERPL-MCNC: 90 MG/DL (ref 65–99)
HCT VFR BLD AUTO: 43.7 % (ref 37–47)
HGB BLD-MCNC: 14.4 G/DL (ref 12–16)
IMM GRANULOCYTES # BLD AUTO: 0.03 K/UL (ref 0–0.11)
IMM GRANULOCYTES NFR BLD AUTO: 0.4 % (ref 0–0.9)
LYMPHOCYTES # BLD AUTO: 1.64 K/UL (ref 1–4.8)
LYMPHOCYTES NFR BLD: 22.6 % (ref 22–41)
MCH RBC QN AUTO: 29.8 PG (ref 27–33)
MCHC RBC AUTO-ENTMCNC: 33 G/DL (ref 33.6–35)
MCV RBC AUTO: 90.5 FL (ref 81.4–97.8)
MONOCYTES # BLD AUTO: 0.59 K/UL (ref 0–0.85)
MONOCYTES NFR BLD AUTO: 8.1 % (ref 0–13.4)
NEUTROPHILS # BLD AUTO: 4.89 K/UL (ref 2–7.15)
NEUTROPHILS NFR BLD: 67.5 % (ref 44–72)
NRBC # BLD AUTO: 0 K/UL
NRBC BLD-RTO: 0 /100 WBC
PLATELET # BLD AUTO: 271 K/UL (ref 164–446)
PMV BLD AUTO: 11.3 FL (ref 9–12.9)
POTASSIUM SERPL-SCNC: 4.6 MMOL/L (ref 3.6–5.5)
PROT SERPL-MCNC: 7 G/DL (ref 6–8.2)
RBC # BLD AUTO: 4.83 M/UL (ref 4.2–5.4)
SODIUM SERPL-SCNC: 140 MMOL/L (ref 135–145)
URATE SERPL-MCNC: 3.5 MG/DL (ref 1.9–8.2)
WBC # BLD AUTO: 7.3 K/UL (ref 4.8–10.8)

## 2022-08-31 PROCEDURE — 86431 RHEUMATOID FACTOR QUANT: CPT

## 2022-08-31 PROCEDURE — 86039 ANTINUCLEAR ANTIBODIES (ANA): CPT

## 2022-08-31 PROCEDURE — 86038 ANTINUCLEAR ANTIBODIES: CPT

## 2022-08-31 PROCEDURE — 72141 MRI NECK SPINE W/O DYE: CPT

## 2022-08-31 PROCEDURE — 36415 COLL VENOUS BLD VENIPUNCTURE: CPT

## 2022-08-31 PROCEDURE — 86812 HLA TYPING A B OR C: CPT

## 2022-08-31 PROCEDURE — 84550 ASSAY OF BLOOD/URIC ACID: CPT

## 2022-08-31 PROCEDURE — 85025 COMPLETE CBC W/AUTO DIFF WBC: CPT

## 2022-08-31 PROCEDURE — 80053 COMPREHEN METABOLIC PANEL: CPT

## 2022-08-31 PROCEDURE — 86200 CCP ANTIBODY: CPT

## 2022-08-31 PROCEDURE — 84443 ASSAY THYROID STIM HORMONE: CPT

## 2022-08-31 PROCEDURE — 85652 RBC SED RATE AUTOMATED: CPT

## 2022-09-01 LAB
RHEUMATOID FACT SER IA-ACNC: <10 IU/ML (ref 0–14)
TSH SERPL DL<=0.005 MIU/L-ACNC: 1.33 UIU/ML (ref 0.38–5.33)

## 2022-09-02 LAB
CCP IGG SERPL-ACNC: 2 UNITS (ref 0–19)
HLA-B27 QL FC: NEGATIVE
NUCLEAR IGG SER QL IA: DETECTED

## 2022-09-04 LAB
ANA INTERPRETIVE COMMENT Q5143: ABNORMAL
ANA PATTERN Q5144: ABNORMAL
ANA TITER Q5145: ABNORMAL
ANTINUCLEAR ANTIBODY (ANA), HEP-2, IGG Q5142: DETECTED

## 2022-09-05 DIAGNOSIS — M54.2 CERVICAL PAIN (NECK): ICD-10-CM

## 2022-09-05 DIAGNOSIS — G89.29 CHRONIC PAIN OF LEFT KNEE: ICD-10-CM

## 2022-09-05 DIAGNOSIS — M25.562 CHRONIC PAIN OF LEFT KNEE: ICD-10-CM

## 2022-09-07 ENCOUNTER — OFFICE VISIT (OUTPATIENT)
Dept: MEDICAL GROUP | Age: 42
End: 2022-09-07
Payer: COMMERCIAL

## 2022-09-07 VITALS
SYSTOLIC BLOOD PRESSURE: 110 MMHG | DIASTOLIC BLOOD PRESSURE: 68 MMHG | WEIGHT: 140 LBS | HEART RATE: 90 BPM | OXYGEN SATURATION: 97 % | RESPIRATION RATE: 16 BRPM | BODY MASS INDEX: 22.5 KG/M2 | TEMPERATURE: 98.3 F | HEIGHT: 66 IN

## 2022-09-07 DIAGNOSIS — G43.109 MIGRAINE WITH AURA AND WITHOUT STATUS MIGRAINOSUS, NOT INTRACTABLE: ICD-10-CM

## 2022-09-07 DIAGNOSIS — R76.8 POSITIVE ANA (ANTINUCLEAR ANTIBODY): ICD-10-CM

## 2022-09-07 DIAGNOSIS — M54.2 CHRONIC NECK PAIN: ICD-10-CM

## 2022-09-07 DIAGNOSIS — G89.29 CHRONIC NECK PAIN: ICD-10-CM

## 2022-09-07 PROCEDURE — 99214 OFFICE O/P EST MOD 30 MIN: CPT | Performed by: INTERNAL MEDICINE

## 2022-09-07 ASSESSMENT — ENCOUNTER SYMPTOMS
EYES NEGATIVE: 1
GASTROINTESTINAL NEGATIVE: 1
RESPIRATORY NEGATIVE: 1
CARDIOVASCULAR NEGATIVE: 1
CONSTITUTIONAL NEGATIVE: 1
PSYCHIATRIC NEGATIVE: 1
MUSCULOSKELETAL NEGATIVE: 1
NEUROLOGICAL NEGATIVE: 1

## 2022-09-07 ASSESSMENT — FIBROSIS 4 INDEX: FIB4 SCORE: 0.79

## 2022-09-07 NOTE — PROGRESS NOTES
Subjective     Gem Grubbs is a 42 y.o. female who presents with Follow-Up (Lab review )  Patient is here for 1 week follow-up for her flareup of her chronic neck pain which is here for the last 2 to 4 years.  Is also discomforting with migraine headaches.  Sees a neurologist for the migraines.  We started last week.  Gave her a shot of Kenalog systemically not as a trigger point.  Gave her much relief as it has in the past.  She also took some NSAIDs with some relief.  The swelling she feels is gone down somewhat and this discomfort in her neck and upper back are much better.  Is having no fever chills chest pain dyspnea pleurisy symptoms joint swelling blood in the urine cough abdominal pain or skin rash.  No peripheral joint pains.    The lab was reviewed with her which showed a mildly positive TERESITA at 1: 160 with negative CCP and RA factors.  MRI of the neck was normal and showing no evidence of spondylopathy.  Just degenerative changes.  These were mild.  There was no mass in the posterior cervical region noted.      Outpatient Medications Prior to Visit   Medication Sig Dispense Refill   • meloxicam (MOBIC) 15 MG tablet Take 1 Tablet by mouth every day. 30 Tablet 1   • tizanidine (ZANAFLEX) 4 MG Tab Take 1 Tablet by mouth every 6 hours as needed (neck pain and spasms). 30 Tablet 3   • sumatriptan (IMITREX) 100 MG tablet TAKE 1 TABLET BY MOUTH ONCE AS NEEDED FOR MIGRAINE FOR UP TO 1 DOSE 9 Tablet 3   • progesterone (PROMETRIUM) 200 MG capsule        No facility-administered medications prior to visit.     Patient Active Problem List    Diagnosis Date Noted   • Chronic neck pain 09/07/2022   • Migraine with aura and without status migrainosus, not intractable 08/10/2015   • Anxiety 08/10/2015   • Insomnia 08/10/2015     Allergies   Allergen Reactions   • Codeine Itching     Hospital Outpatient Visit on 08/31/2022   Component Date Value   • Sed Rate Westergren 08/31/2022 5    • Sodium 08/31/2022 140    •  Potassium 08/31/2022 4.6    • Chloride 08/31/2022 107    • Co2 08/31/2022 24    • Anion Gap 08/31/2022 9.0    • Glucose 08/31/2022 90    • Bun 08/31/2022 16    • Creatinine 08/31/2022 0.77    • Calcium 08/31/2022 9.2    • AST(SGOT) 08/31/2022 17    • ALT(SGPT) 08/31/2022 11    • Alkaline Phosphatase 08/31/2022 61    • Total Bilirubin 08/31/2022 0.2    • Albumin 08/31/2022 4.4    • Total Protein 08/31/2022 7.0    • Globulin 08/31/2022 2.6    • A-G Ratio 08/31/2022 1.7    • WBC 08/31/2022 7.3    • RBC 08/31/2022 4.83    • Hemoglobin 08/31/2022 14.4    • Hematocrit 08/31/2022 43.7    • MCV 08/31/2022 90.5    • MCH 08/31/2022 29.8    • MCHC 08/31/2022 33.0 (A)   • RDW 08/31/2022 45.0    • Platelet Count 08/31/2022 271    • MPV 08/31/2022 11.3    • Neutrophils-Polys 08/31/2022 67.50    • Lymphocytes 08/31/2022 22.60    • Monocytes 08/31/2022 8.10    • Eosinophils 08/31/2022 0.60    • Basophils 08/31/2022 0.80    • Immature Granulocytes 08/31/2022 0.40    • Nucleated RBC 08/31/2022 0.00    • Neutrophils (Absolute) 08/31/2022 4.89    • Lymphs (Absolute) 08/31/2022 1.64    • Monos (Absolute) 08/31/2022 0.59    • Eos (Absolute) 08/31/2022 0.04    • Baso (Absolute) 08/31/2022 0.06    • Immature Granulocytes (a* 08/31/2022 0.03    • NRBC (Absolute) 08/31/2022 0.00    • Uric Acid 08/31/2022 3.5    • Ccp Antibodies 08/31/2022 2    • Rheumatoid Factor -Neph- 08/31/2022 <10    • Antinuclear Antibody 08/31/2022 Detected (A)   • TSH 08/31/2022 1.330    • HLA-B27 Screen 08/31/2022 Negative    • Fasting Status 08/31/2022 Fasting    • GFR (CKD-EPI) 08/31/2022 98    • Antinuclear Antibody (AN* 08/31/2022 Detected (A)   • TERESITA Interpretive Comment 08/31/2022 See Note    • TERESITA Pattern 08/31/2022 Homogeneous (A)   • TERESITA Titer 08/31/2022 1:160 (A)      No results found for: HBA1C  Lab Results   Component Value Date/Time    SODIUM 140 08/31/2022 11:52 AM    POTASSIUM 4.6 08/31/2022 11:52 AM    CHLORIDE 107 08/31/2022 11:52 AM    CO2 24  "08/31/2022 11:52 AM    GLUCOSE 90 08/31/2022 11:52 AM    BUN 16 08/31/2022 11:52 AM    CREATININE 0.77 08/31/2022 11:52 AM    ALKPHOSPHAT 61 08/31/2022 11:52 AM    ASTSGOT 17 08/31/2022 11:52 AM    ALTSGPT 11 08/31/2022 11:52 AM    TBILIRUBIN 0.2 08/31/2022 11:52 AM     No results found for: INR  Lab Results   Component Value Date/Time    CHOLSTRLTOT 178 03/11/2021 08:24 AM     (H) 03/11/2021 08:24 AM    HDL 49 03/11/2021 08:24 AM    TRIGLYCERIDE 70 03/11/2021 08:24 AM       No results found for: TESTOSTERONE  No results found for: TSH  Lab Results   Component Value Date/Time    FREET4 1.02 09/11/2014 02:41 PM     Lab Results   Component Value Date/Time    URICACID 3.5 08/31/2022 11:52 AM     No components found for: VITB12  Lab Results   Component Value Date/Time    25HYDROXY 44 09/11/2014 02:41 PM   '            HPI    Review of Systems   Constitutional: Negative.    HENT: Negative.     Eyes: Negative.    Respiratory: Negative.     Cardiovascular: Negative.    Gastrointestinal: Negative.    Genitourinary: Negative.    Musculoskeletal: Negative.    Skin: Negative.    Neurological: Negative.    Endo/Heme/Allergies: Negative.    Psychiatric/Behavioral: Negative.              Objective     /68 (BP Location: Right arm, Patient Position: Sitting, BP Cuff Size: Adult)   Pulse 90   Temp 36.8 °C (98.3 °F) (Temporal)   Resp 16   Ht 1.676 m (5' 6\")   Wt 63.5 kg (140 lb)   SpO2 97%   BMI 22.60 kg/m²      Physical Exam  Vitals reviewed.   Constitutional:       General: She is not in acute distress.     Appearance: She is well-developed. She is not diaphoretic.   HENT:      Head: Normocephalic and atraumatic.      Right Ear: External ear normal.      Left Ear: External ear normal.      Nose: Nose normal.      Mouth/Throat:      Pharynx: No oropharyngeal exudate.   Eyes:      General: No scleral icterus.        Right eye: No discharge.         Left eye: No discharge.      Conjunctiva/sclera: Conjunctivae " normal.      Pupils: Pupils are equal, round, and reactive to light.   Neck:      Thyroid: No thyromegaly.      Vascular: No JVD.      Trachea: No tracheal deviation.   Cardiovascular:      Rate and Rhythm: Normal rate and regular rhythm.      Heart sounds: Normal heart sounds. No murmur heard.    No friction rub. No gallop.   Pulmonary:      Effort: Pulmonary effort is normal. No respiratory distress.      Breath sounds: Normal breath sounds. No stridor. No wheezing or rales.   Chest:      Chest wall: No tenderness.   Abdominal:      General: Bowel sounds are normal. There is no distension.      Palpations: Abdomen is soft. There is no mass.      Tenderness: There is no abdominal tenderness. There is no guarding or rebound.   Musculoskeletal:         General: No tenderness. Normal range of motion.      Cervical back: Normal range of motion and neck supple.   Lymphadenopathy:      Cervical: No cervical adenopathy.   Skin:     General: Skin is warm and dry.      Coloration: Skin is not pale.      Findings: No erythema or rash.   Neurological:      Mental Status: She is alert and oriented to person, place, and time.      Cranial Nerves: No cranial nerve deficit.      Motor: No abnormal muscle tone.      Coordination: Coordination normal.      Deep Tendon Reflexes: Reflexes are normal and symmetric. Reflexes normal.   Psychiatric:         Behavior: Behavior normal.         Thought Content: Thought content normal.         Judgment: Judgment normal.                           Assessment & Plan        1. Positive TERESITA (antinuclear antibody)-patient has no features to suggest SLE, discoid lupus or lupus nephritis or lupus hepatitis.  Labs are all unremarkable in that regard.  We will check an antidouble-stranded DNA to help confirm my impression.  Referral to rheumatology for second opinion.  Explained this may take some time and is not necessary that she rushed to get into see 1 as she does not appear to have anything other  than a false positive TERESITA at this point.  No last since has been a chronic ongoing problem and it may evolve into SLE or some other rheumatological disorder that still yet being masked such as an overlap syndrome, yet to be determined.     - ANTI-DNA (DS)    2. Chronic neck pain-good response to peripheral Kenalog injection and oral NSAIDs.  Follow-up with neurologist and referral to physical medicine sent         3. Migraine with aura and without status migrainosus, not intractable  Under good control continue follow-up with neurology.  Continue Imitrex and other agents as needed.

## 2022-09-29 ENCOUNTER — APPOINTMENT (OUTPATIENT)
Dept: PHYSICAL MEDICINE AND REHAB | Facility: MEDICAL CENTER | Age: 42
End: 2022-09-29
Payer: COMMERCIAL

## 2022-12-15 DIAGNOSIS — G43.109 MIGRAINE WITH AURA AND WITHOUT STATUS MIGRAINOSUS, NOT INTRACTABLE: ICD-10-CM

## 2022-12-20 RX ORDER — SUMATRIPTAN 100 MG/1
TABLET, FILM COATED ORAL
Qty: 9 TABLET | Refills: 3 | Status: SHIPPED | OUTPATIENT
Start: 2022-12-20 | End: 2024-01-15 | Stop reason: SDUPTHER

## 2022-12-20 NOTE — TELEPHONE ENCOUNTER
Received request via: Pharmacy    Was the patient seen in the last year in this department? Yes 9/7/22    Does the patient have an active prescription (recently filled or refills available) for medication(s) requested? No    Does the patient have retirement Plus and need 100 day supply (blood pressure, diabetes and cholesterol meds only)? Patient does not have SCP

## 2023-01-04 ENCOUNTER — TELEMEDICINE (OUTPATIENT)
Dept: MEDICAL GROUP | Age: 43
End: 2023-01-04
Payer: COMMERCIAL

## 2023-01-04 VITALS
HEIGHT: 66 IN | WEIGHT: 135 LBS | BODY MASS INDEX: 21.69 KG/M2 | HEART RATE: 80 BPM | RESPIRATION RATE: 12 BRPM | TEMPERATURE: 98.6 F

## 2023-01-04 DIAGNOSIS — M54.2 CHRONIC NECK PAIN: ICD-10-CM

## 2023-01-04 DIAGNOSIS — G89.29 CHRONIC NECK PAIN: ICD-10-CM

## 2023-01-04 DIAGNOSIS — J06.9 UPPER RESPIRATORY TRACT INFECTION, UNSPECIFIED TYPE: ICD-10-CM

## 2023-01-04 DIAGNOSIS — F51.01 PRIMARY INSOMNIA: ICD-10-CM

## 2023-01-04 DIAGNOSIS — F41.9 ANXIETY: ICD-10-CM

## 2023-01-04 PROCEDURE — 99214 OFFICE O/P EST MOD 30 MIN: CPT | Mod: 95 | Performed by: INTERNAL MEDICINE

## 2023-01-04 RX ORDER — AZITHROMYCIN 250 MG/1
TABLET, FILM COATED ORAL
Qty: 6 TABLET | Refills: 1 | Status: SHIPPED | OUTPATIENT
Start: 2023-01-04 | End: 2023-01-05 | Stop reason: SDUPTHER

## 2023-01-04 ASSESSMENT — FIBROSIS 4 INDEX: FIB4 SCORE: 0.79

## 2023-01-04 ASSESSMENT — PATIENT HEALTH QUESTIONNAIRE - PHQ9: CLINICAL INTERPRETATION OF PHQ2 SCORE: 0

## 2023-01-04 NOTE — PROGRESS NOTES
Virtual Visit: Established Patient   This visit was conducted via Zoom using secure and encrypted videoconferencing technology.   The patient was in their home in the state [facility].    The patient's identity was confirmed and verbal consent was obtained for this virtual visit.     Subjective:   CC:   Chief Complaint   Patient presents with    RAUL Calvosa Bernice Grubbs is a 42 y.o. female presenting for evaluation and management of:  Patient has a 3-month history of malaise myalgias and cough sore throat rhinorrhea.   She has paranasal discharge and purulent sputum production.  No fever chills.  Has not received flu vaccine but she did test negative for COVID with a home test yesterday.        And  The patient is here for followup of chronic medical problems listed below. The patient is compliant with medications and having no side effects from them. Denies chest pain, abdominal pain, dyspnea, myalgias, or cough.   Patient Active Problem List   Diagnosis    Migraine with aura and without status migrainosus, not intractable    Anxiety    Insomnia    Chronic neck pain     Outpatient Medications Prior to Visit   Medication Sig Dispense Refill    sumatriptan (IMITREX) 100 MG tablet TAKE 1 TABLET BY MOUTH ONCE AS NEEDED FOR MIGRAINE FOR UP TO 1 DOSE 9 Tablet 3    meloxicam (MOBIC) 15 MG tablet Take 1 Tablet by mouth every day. 30 Tablet 1    tizanidine (ZANAFLEX) 4 MG Tab Take 1 Tablet by mouth every 6 hours as needed (neck pain and spasms). 30 Tablet 3    progesterone (PROMETRIUM) 200 MG capsule        No facility-administered medications prior to visit.         ROS            Current medicines (including changes today)  Current Outpatient Medications   Medication Sig Dispense Refill    azithromycin (ZITHROMAX) 250 MG Tab Take 2 tabs today then 1 per day for 4 days 6 Tablet 1    sumatriptan (IMITREX) 100 MG tablet TAKE 1 TABLET BY MOUTH ONCE AS NEEDED FOR MIGRAINE FOR UP TO 1 DOSE 9 Tablet 3    meloxicam (MOBIC)  "15 MG tablet Take 1 Tablet by mouth every day. 30 Tablet 1    tizanidine (ZANAFLEX) 4 MG Tab Take 1 Tablet by mouth every 6 hours as needed (neck pain and spasms). 30 Tablet 3    progesterone (PROMETRIUM) 200 MG capsule        No current facility-administered medications for this visit.       Patient Active Problem List    Diagnosis Date Noted    Chronic neck pain 09/07/2022    Migraine with aura and without status migrainosus, not intractable 08/10/2015    Anxiety 08/10/2015    Insomnia 08/10/2015        Objective:   Ht 1.676 m (5' 6\")   Wt 61.2 kg (135 lb)   BMI 21.79 kg/m²     Physical Exam:    Constitutional: Alert, no distress, well-groomed.  Skin: No rashes in visible areas.  Eye: Round. Conjunctiva clear, lids normal. No icterus.   ENMT: Lips pink without lesions, good dentition, moist mucous membranes. Phonation normal.  Neck: No masses, no thyromegaly. Moves freely without pain.  Respiratory: Unlabored respiratory effort, no cough or audible wheeze  Psych: Alert and oriented x3, normal affect and mood.     Assessment and Plan:   The following treatment plan was discussed:     1. Upper respiratory tract infection, unspecified type-new problem.  Probably viral URI but cannot rule out secondary bacterial infection.  Start Zithromax.  Rest and fluids.  Robitussin-DM as needed.  Use      - azithromycin (ZITHROMAX) 250 MG Tab; Take 2 tabs today then 1 per day for 4 days  Dispense: 6 Tablet; Refill: 1    2. Primary insomnia  Under good control continue current regimen with tizanidine as needed for sleep.  3. Anxiety  Under good control.  Without medication.    4.  Neck pain.  Chronic.  Under good control with meloxicam as needed.  Continue unchanged.    Follow-up: No follow-ups on file.         "

## 2023-01-05 DIAGNOSIS — J06.9 UPPER RESPIRATORY TRACT INFECTION, UNSPECIFIED TYPE: ICD-10-CM

## 2023-01-05 RX ORDER — AZITHROMYCIN 250 MG/1
TABLET, FILM COATED ORAL
Qty: 6 TABLET | Refills: 1 | Status: SHIPPED | OUTPATIENT
Start: 2023-01-05

## 2023-01-05 NOTE — TELEPHONE ENCOUNTER
Received request via: Patient    Was the patient seen in the last year in this department? Yes    Does the patient have an active prescription (recently filled or refills available) for medication(s) requested? No    Does the patient have [employer]P Plus and need 100 day supply (blood pressure, diabetes and cholesterol meds only)? Patient does not have SCP      Same goes for this patient she needs her antibiotics sent to [facility] it is changed in her chart

## 2023-10-04 RX ORDER — PROGESTERONE 200 MG/1
200 CAPSULE ORAL DAILY
Qty: 90 CAPSULE | Refills: 0 | Status: SHIPPED | OUTPATIENT
Start: 2023-10-04

## 2024-01-05 ENCOUNTER — TELEPHONE (OUTPATIENT)
Dept: MEDICAL GROUP | Age: 44
End: 2024-01-05
Payer: COMMERCIAL

## 2024-01-11 ENCOUNTER — APPOINTMENT (OUTPATIENT)
Dept: MEDICAL GROUP | Age: 44
End: 2024-01-11
Payer: COMMERCIAL

## 2024-01-15 DIAGNOSIS — M54.2 CERVICALGIA: ICD-10-CM

## 2024-01-15 DIAGNOSIS — G43.109 MIGRAINE WITH AURA AND WITHOUT STATUS MIGRAINOSUS, NOT INTRACTABLE: ICD-10-CM

## 2024-01-16 RX ORDER — TIZANIDINE 4 MG/1
4 TABLET ORAL EVERY 6 HOURS PRN
Qty: 30 TABLET | Refills: 3 | Status: SHIPPED | OUTPATIENT
Start: 2024-01-16

## 2024-01-16 RX ORDER — SUMATRIPTAN 100 MG/1
100 TABLET, FILM COATED ORAL
Qty: 9 TABLET | Refills: 3 | Status: SHIPPED | OUTPATIENT
Start: 2024-01-16

## 2024-01-16 RX ORDER — MELOXICAM 15 MG/1
15 TABLET ORAL DAILY
Qty: 90 TABLET | Refills: 0 | Status: SHIPPED | OUTPATIENT
Start: 2024-01-16

## 2024-01-16 NOTE — TELEPHONE ENCOUNTER
Needs to be seen for any more refills, after this one.  Please call patient and have him schedule 3 month follow-up appointment with PCP

## 2024-01-16 NOTE — TELEPHONE ENCOUNTER
Needs to be seen for any more refills, after this one; make 3 mos appt now.  Please call patient to schedule

## 2024-11-21 ENCOUNTER — OFFICE VISIT (OUTPATIENT)
Dept: MEDICAL GROUP | Age: 44
End: 2024-11-21
Payer: COMMERCIAL

## 2024-11-21 VITALS
WEIGHT: 148 LBS | SYSTOLIC BLOOD PRESSURE: 110 MMHG | OXYGEN SATURATION: 100 % | BODY MASS INDEX: 23.78 KG/M2 | DIASTOLIC BLOOD PRESSURE: 60 MMHG | HEIGHT: 66 IN | TEMPERATURE: 98.2 F | HEART RATE: 82 BPM

## 2024-11-21 DIAGNOSIS — M54.2 CHRONIC NECK PAIN: ICD-10-CM

## 2024-11-21 DIAGNOSIS — G89.29 CHRONIC NECK PAIN: ICD-10-CM

## 2024-11-21 DIAGNOSIS — G43.109 MIGRAINE WITH AURA AND WITHOUT STATUS MIGRAINOSUS, NOT INTRACTABLE: ICD-10-CM

## 2024-11-21 PROCEDURE — 3078F DIAST BP <80 MM HG: CPT | Performed by: STUDENT IN AN ORGANIZED HEALTH CARE EDUCATION/TRAINING PROGRAM

## 2024-11-21 PROCEDURE — 99214 OFFICE O/P EST MOD 30 MIN: CPT | Performed by: STUDENT IN AN ORGANIZED HEALTH CARE EDUCATION/TRAINING PROGRAM

## 2024-11-21 PROCEDURE — 3074F SYST BP LT 130 MM HG: CPT | Performed by: STUDENT IN AN ORGANIZED HEALTH CARE EDUCATION/TRAINING PROGRAM

## 2024-11-21 RX ORDER — ESTRADIOL 0.05 MG/D
1 PATCH, EXTENDED RELEASE TRANSDERMAL
COMMUNITY
Start: 2024-11-19

## 2024-11-21 RX ORDER — TRIAMCINOLONE ACETONIDE 40 MG/ML
40 INJECTION, SUSPENSION INTRA-ARTICULAR; INTRAMUSCULAR ONCE
Status: DISCONTINUED | OUTPATIENT
Start: 2024-11-21 | End: 2024-11-21

## 2024-11-21 ASSESSMENT — ENCOUNTER SYMPTOMS
CHILLS: 0
SPEECH CHANGE: 0
ORTHOPNEA: 0
WEAKNESS: 0
SHORTNESS OF BREATH: 0
BACK PAIN: 0
DEPRESSION: 0
FEVER: 0
DOUBLE VISION: 0
SENSORY CHANGE: 0
DIZZINESS: 0
NECK PAIN: 1
BLOOD IN STOOL: 0
MYALGIAS: 1
ABDOMINAL PAIN: 0
PALPITATIONS: 0
BLURRED VISION: 0
BRUISES/BLEEDS EASILY: 0
COUGH: 0
HEADACHES: 1

## 2024-11-21 ASSESSMENT — PATIENT HEALTH QUESTIONNAIRE - PHQ9: CLINICAL INTERPRETATION OF PHQ2 SCORE: 0

## 2024-11-21 NOTE — PATIENT INSTRUCTIONS
-Continue home meds  -Follow-up on referral to neurology  -Continue to follow-up with your PCP as scheduled  -We will notify you once Kenalog shots are available at our office

## 2024-11-21 NOTE — PROGRESS NOTES
Subjective:     CC: Neck pain    HPI:   History of Present Illness  The patient is a 44-year-old female presenting for neck pain.    She has been experiencing neck pain for approximately 3 months, which intensifies rapidly and becomes difficult to alleviate. Despite no identifiable injury, she experiences muscle swelling and tension, akin to an electric shock sensation. She has previously received cortisone injections for this issue and is interested in receiving another injection to see if it can help relax her muscles. Last year, she was prescribed muscle relaxers and anti-inflammatories, which helped manage her pain, but this year, they have not been effective. She has been managing her condition with exercise, icing, heating, and supplements.    She suspects that her neck pain may be linked to her migraines, as the pain radiates and worsens with movement. Her migraines are frequent and often precede the onset of neck pain. She uses Imitrex to manage her migraines, which also seems to alleviate her neck pain. She is considering a preventative migraine medication. She has a long history of migraine treatment, having tried verapamil, propranolol, Topamax, Depakote, and Prozac under the care of a neurologist. However, these treatments have not been effective. She is contemplating the use of CGRP. She has not consulted a neurologist in the past 10 years.    An MRI scan was conducted, but no abnormalities were detected.       ROS:  Review of Systems   Constitutional:  Negative for chills, fever and malaise/fatigue.   HENT:  Negative for nosebleeds and tinnitus.    Eyes:  Negative for blurred vision and double vision.   Respiratory:  Negative for cough and shortness of breath.    Cardiovascular:  Negative for chest pain, palpitations, orthopnea and leg swelling.   Gastrointestinal:  Negative for abdominal pain, blood in stool and melena.   Genitourinary:  Negative for dysuria and urgency.   Musculoskeletal:  Positive  "for myalgias and neck pain. Negative for back pain and joint pain.   Skin:  Negative for rash.   Neurological:  Positive for headaches. Negative for dizziness, sensory change, speech change and weakness.   Endo/Heme/Allergies:  Does not bruise/bleed easily.   Psychiatric/Behavioral:  Negative for depression and suicidal ideas.        Objective:     Exam:  /60 (BP Location: Left arm, Patient Position: Sitting, BP Cuff Size: Adult)   Pulse 82   Temp 36.8 °C (98.2 °F) (Temporal)   Ht 1.676 m (5' 6\")   Wt 67.1 kg (148 lb)   SpO2 100%   BMI 23.89 kg/m²  Body mass index is 23.89 kg/m².    Physical Exam  Vitals reviewed.   Constitutional:       General: She is not in acute distress.  HENT:      Head: Normocephalic and atraumatic.      Mouth/Throat:      Mouth: Mucous membranes are moist.   Eyes:      General: No scleral icterus.     Extraocular Movements: Extraocular movements intact.      Pupils: Pupils are equal, round, and reactive to light.   Cardiovascular:      Rate and Rhythm: Normal rate and regular rhythm.      Pulses: Normal pulses.      Heart sounds: Normal heart sounds. No murmur heard.  Pulmonary:      Effort: Pulmonary effort is normal. No respiratory distress.      Breath sounds: Normal breath sounds. No wheezing.   Abdominal:      General: There is no distension.      Palpations: Abdomen is soft.      Tenderness: There is no abdominal tenderness. There is no guarding or rebound.   Musculoskeletal:      Cervical back: Normal range of motion and neck supple.      Right lower leg: No edema.      Left lower leg: No edema.   Skin:     General: Skin is warm.      Capillary Refill: Capillary refill takes less than 2 seconds.      Coloration: Skin is not jaundiced.   Neurological:      General: No focal deficit present.      Mental Status: She is alert.      Cranial Nerves: No cranial nerve deficit.      Sensory: No sensory deficit.   Psychiatric:         Mood and Affect: Mood normal.         Behavior: " Behavior normal.       Labs: None    Assessment & Plan:     44 y.o. female with the following -     1. Migraine with aura and without status migrainosus, not intractable  The patient has a long history of migraines and has tried various preventive medications such as verapamil, propranolol, Topamax, Depakote, and Prozac, none of which were effective. She currently uses Imitrex, which helps to calm down the pain associated with migraines. A referral to a neurologist will be made to explore the possibility of trying CGRP inhibitors or other advanced treatments for migraines.    - Referral to Neurology    2. Chronic neck pain  The neck pain has been persistent for about 3 months and seems to flare up quickly. Previous MRI results were normal. The pain radiates and worsens with movement, and there is some swelling in the muscles. A cortisone injection was going to be administered today but unfortunately is not available.     Return if symptoms worsen or fail to improve.    Please note that this dictation was created using voice recognition software. I have made every reasonable attempt to correct obvious errors, but I expect that there are errors of grammar and possibly content that I did not discover before finalizing the note.

## 2024-11-26 ENCOUNTER — TELEPHONE (OUTPATIENT)
Dept: NEUROLOGY | Facility: MEDICAL CENTER | Age: 44
End: 2024-11-26

## 2024-11-26 ENCOUNTER — OFFICE VISIT (OUTPATIENT)
Dept: NEUROLOGY | Facility: MEDICAL CENTER | Age: 44
End: 2024-11-26
Attending: PSYCHIATRY & NEUROLOGY
Payer: COMMERCIAL

## 2024-11-26 VITALS
HEIGHT: 66 IN | SYSTOLIC BLOOD PRESSURE: 104 MMHG | HEART RATE: 79 BPM | TEMPERATURE: 99 F | WEIGHT: 139.55 LBS | RESPIRATION RATE: 16 BRPM | BODY MASS INDEX: 22.43 KG/M2 | DIASTOLIC BLOOD PRESSURE: 66 MMHG | OXYGEN SATURATION: 100 %

## 2024-11-26 DIAGNOSIS — G43.109 MIGRAINE WITH AURA AND WITHOUT STATUS MIGRAINOSUS, NOT INTRACTABLE: ICD-10-CM

## 2024-11-26 PROCEDURE — 700111 HCHG RX REV CODE 636 W/ 250 OVERRIDE (IP): Mod: JZ

## 2024-11-26 PROCEDURE — 96372 THER/PROPH/DIAG INJ SC/IM: CPT | Performed by: PSYCHIATRY & NEUROLOGY

## 2024-11-26 PROCEDURE — 99212 OFFICE O/P EST SF 10 MIN: CPT | Performed by: PSYCHIATRY & NEUROLOGY

## 2024-11-26 RX ORDER — GALCANEZUMAB 120 MG/ML
2 INJECTION, SOLUTION SUBCUTANEOUS ONCE
Qty: 2 EACH | Refills: 0 | Status: SHIPPED | OUTPATIENT
Start: 2024-11-26 | End: 2024-11-26

## 2024-11-26 RX ORDER — GALCANEZUMAB 120 MG/ML
1 INJECTION, SOLUTION SUBCUTANEOUS
Qty: 1 EACH | Refills: 5 | Status: SHIPPED | OUTPATIENT
Start: 2024-11-26

## 2024-11-26 RX ORDER — NAPROXEN SODIUM 550 MG/1
TABLET ORAL
Qty: 45 TABLET | Refills: 4 | Status: SHIPPED | OUTPATIENT
Start: 2024-11-26

## 2024-11-26 RX ORDER — SUMATRIPTAN SUCCINATE 100 MG/1
TABLET ORAL
Qty: 9 TABLET | Refills: 6 | Status: SHIPPED | OUTPATIENT
Start: 2024-11-26

## 2024-11-26 RX ORDER — KETOROLAC TROMETHAMINE 30 MG/ML
60 INJECTION, SOLUTION INTRAMUSCULAR; INTRAVENOUS ONCE
Status: COMPLETED | OUTPATIENT
Start: 2024-11-26 | End: 2024-11-26

## 2024-11-26 RX ADMIN — KETOROLAC TROMETHAMINE 60 MG: 30 INJECTION, SOLUTION INTRAMUSCULAR at 11:12

## 2024-11-26 ASSESSMENT — ENCOUNTER SYMPTOMS
HEADACHES: 1
PHOTOPHOBIA: 0
MEMORY LOSS: 0
TINGLING: 0

## 2024-11-26 NOTE — PROGRESS NOTES
Subjective     Gem Grubbs is a 44 y.o. female who presents from the office of Dr. Giuseppe See MD, for consultation, with a history of migraine with and without aura, and which has evolved into a chronic migraine state     HPI    Gem is a very pleasant 44-year-old right-handed woman whose headaches started at about 13 years of age at menarche, but which have continued and taken on a pattern of chronic chronic migraine for few years at the very least.    Prodrome: She does recognize symptoms lasting for couple of days where her mood can fluctuate, there is significant neck stiffness and at times stabbing pains, there is frequent yawning and malaise.    Aura: In about 10% of headaches, there is visual distortions that can last upwards of 20-30 minutes.  If present, headache always follows.    Headache: Typically unilateral, right to left sided and equal frequency, the pain is throbbing, nausea can occur, vomiting was an historical occurrence.  There are heightened sensitivities, including those to smells, impaired concentration, worsened by simple movements.  She describes increased neck pain and stiffness as well as allodynia.  There is no sinus symptomatology.    Duration: 2-3 days with another day to recover.    Start: Approximately 13 years of age at the time of menarche.    Onset: Typically in the morning hours, she can often awaken with some having started.    Frequency: 4 years she has had more than 15 days every month of headache pain, and of these 15 days, at least 8-10 are associated with more than 4 hours of incapacity.  It could be longer were not for rescue medication.  The frequency of the actual attacks varies anywhere from 4-5 every month.    Triggers: Menses, typically 1 to 2 days before flow onset and then cessation on the day after flow starts.  Weather changes, strong odors and alcohol also trigger them.    Workup: MRI of the brain from 2008 was reportedly normal.  I have no copy of  the report for my review.  MRI of the cervical spine from 2022 was normal.  ESR from August, 2022 was 5.  CBC and CMP at the same time also were negative.    Treatment: Imitrex 100 mg taken with Aleve 220 mg actually works most effectively in stopping the headache from getting worse, she rarely repeats the dose later in the day.  Frova was not well-tolerated, nor was Depakote (alopecia), verapamil (galactorrhea), and Topamax (cognitive impairment).  She has been on no other triptans, none of the new CGRP medicines, Botox, etc.  She may have been on some other calcium channel blockers or beta-blockers, but she cannot be sure.  The name she does seem familiar.    The medical history is unremarkable for asthma, sleep distortion, CAD, CVA, PVD, malignancy, thyroid disease, autoimmune disease, neurodegenerative disease, MS, seizure, arrhythmia, malignancy, liver or kidney disease, or blood dyscrasia.    There is no surgical history of note.    In the family, her mother suffered from CHF, her father suffers from stroke, but her father, and both paternal and maternal grandmothers suffer from headaches.    Menstrual history is remarkable for Prometrium use which has helped with regulation, and with menses she still has had headaches as described above.  She has yet to start estrogen that was recommended, symptoms because it was known to worsen headaches when she used it in the past.    She rarely drinks alcohol, does not smoke or vape.  She works in commercial real estate.  She and her  Greg, are doing well.  She does walk and exercise regularly, swims on a routine basis.    She is on Prometrium 200 mg daily, Imitrex 100 mg as needed, Mobic and Zanaflex.  She was given DESTINEY estrogen patch but has yet to start its use.    Review of Systems   Constitutional:  Negative for malaise/fatigue.   Eyes:  Negative for photophobia.   Neurological:  Positive for headaches. Negative for tingling.   Psychiatric/Behavioral:   "Negative for memory loss.    All other systems reviewed and are negative.    Objective     /66 (BP Location: Left arm, Patient Position: Sitting, BP Cuff Size: Adult)   Pulse 79   Temp 37.2 °C (99 °F) (Temporal)   Resp 16   Ht 1.676 m (5' 6\")   Wt 63.3 kg (139 lb 8.8 oz)   SpO2 100%   BMI 22.52 kg/m²      Physical Exam    She appears in no acute distress, though she still is a little postictal with a migraine that occurred earlier this morning.  Vital signs are stable.  There is no malar rash.  There is no jaw or temporal tenderness, or jaw claudication.  The neck is supple, range of motion is full.  Carotid pulses are present without asymmetry.  Cardiac evaluation reveals a regular rhythm.  There is no lower extremity edema.     Neurological Exam    Fully oriented, there is no aphasia, agnosia, apraxia, or inattention.    PERRLA/EOMI, visual fields are full to finger counting on confrontation bilaterally.  Facial movements are symmetric, sensory exam is intact to temperature and light touch bilaterally.  The tongue and uvula midline, jaw movements are intact, shoulder shrug and head rotation are symmetric.    Musculoskeletal exam reveals normal tone bilaterally, there is no tremor, asterixis, or drift.  Strength is 5/5 throughout.  Reflexes are brisk and present at all points, there are no asymmetries, none are dropped.  Both toes are downgoing.    She stands easily, gait is normal and station and stride length, armswing is symmetric.  Heel, toe, and tandem walking are all normal.  There is no appendicular dystaxia.  Fine motor control is normal in all 4 extremities.    Sensory exam is intact to vibration, temperature and pinprick.  Romberg is absent.    Assessment & Plan     Assessment & Plan  Migraine with aura and without status migrainosus, not intractable  Her diagnosis is fairly straightforward, she is of the 16% of migraine suffers to have a headaches both with and without aura, but the " treatments are still the same.  She also has a clearly identified prodrome, Ubrelvy is a drug that has been studied in the circumstances, its use during prodrome onset actually prevented headache evolution and more than 50% of patients.  Still, she has very frequent headaches, she fits criteria for chronic migraine given the greater than 15 days/month of headache pain, and of these days a minimum of 8 days have at least a 4-hour interval of incapacity from the migraine itself.  She would have greater benefit using the Imitrex that she has found to be effective, but giving her Anaprox  mg to take together, she was also instructed to take this as soon as the pain of her headaches begins, not to wait longer, and it can be repeated within an hour or longer if needed.    For maintenance therapy, Emgality 220 mg injections every 4 weeks will be used.  The initial load is 240 mg, then 120 mg every 4 weeks.  Side effects were reviewed.  There are quite a few other treatment options left available to her.  Hopefully a PA for the Emgality can be easily obtained since she has already failed membrane stabilizers and calcium channel blockers.  We will communicate via [facility], follow-up will be scheduled in the next 5 or 6 months.    For the remaining headache she is having today, Toradol 60 mg injection IM will be provided.    Orders:    Galcanezumab-gnlm (EMGALITY) 120 MG/ML Solution Auto-injector; Inject 1 mL under the skin every 4 weeks.    Galcanezumab-gnlm (EMGALITY) 120 MG/ML Solution Auto-injector; Inject 2 mL under the skin one time for 1 dose.    sumatriptan (IMITREX) 100 MG tablet; 1 tab at headache onset; repeat in 1 hour prn    naproxen sodium (ANAPROX DS) 550 MG tablet; 1 tab at headache onset; repeat in 2-4 hr prn    ketorolac (Toradol) injection 60 mg    Time: 60 minutes in total spent on patient care including pre-charting, record review, discussion with healthcare staff and documentation.  This includes  face-to-face time for exam, review, discussion, as well as counseling and coordinating care.

## 2024-11-26 NOTE — TELEPHONE ENCOUNTER
Prior Authorization for Emgality 120mg (Quantity: 2, Days: 28) has been submitted via [id number]    Insurance:  [employer]   Member ID:  [id number]  BIN: [id number]  PCN: [id number]  Group: [id number]     Will follow up in 24-48 business hours.

## 2024-11-26 NOTE — TELEPHONE ENCOUNTER
Prior Authorization for Emgality 120mg has been approved for a quantity of 2 , day supply 28    Prior Authorization reference number: [id number]  Insurance: : [employer]   Member ID:  [id number]  BIN: [id number]  PCN: [id number]  Group: [id number]   Effective dates: 11/26/24-12/24/2024  Copay: $40     Is patient eligible to fill with [facility]? Yes    Next Steps: The Patients copay is less than $5.00. Will contact the patient to determine choice of pharmacy, if applicable.

## 2024-11-26 NOTE — TELEPHONE ENCOUNTER
Received New Start PA request via [facility]OT  for Emgality 120mg. (Quantity:1, Day Supply:28)     Insurance: [email or web address]   Member ID:  [id number]  BIN: [id number]  PCN: [id number]  Group: [id number]     Ran Test claim via [place] & medication Rejects stating prior authorization is required.

## 2024-11-26 NOTE — ASSESSMENT & PLAN NOTE
Her diagnosis is fairly straightforward, she is of the 16% of migraine suffers to have a headaches both with and without aura, but the treatments are still the same.  She also has a clearly identified prodrome, Ubrelvy is a drug that has been studied in the circumstances, its use during prodrome onset actually prevented headache evolution and more than 50% of patients.  Still, she has very frequent headaches, she fits criteria for chronic migraine given the greater than 15 days/month of headache pain, and of these days a minimum of 8 days have at least a 4-hour interval of incapacity from the migraine itself.  She would have greater benefit using the Imitrex that she has found to be effective, but giving her Anaprox  mg to take together, she was also instructed to take this as soon as the pain of her headaches begins, not to wait longer, and it can be repeated within an hour or longer if needed.    For maintenance therapy, Emgality 220 mg injections every 4 weeks will be used.  The initial load is 240 mg, then 120 mg every 4 weeks.  Side effects were reviewed.  There are quite a few other treatment options left available to her.  Hopefully a PA for the Emgality can be easily obtained since she has already failed membrane stabilizers and calcium channel blockers.  We will communicate via [facility], follow-up will be scheduled in the next 5 or 6 months.    For the remaining headache she is having today, Toradol 60 mg injection IM will be provided.    Orders:    Galcanezumab-gnlm (EMGALITY) 120 MG/ML Solution Auto-injector; Inject 1 mL under the skin every 4 weeks.    Galcanezumab-gnlm (EMGALITY) 120 MG/ML Solution Auto-injector; Inject 2 mL under the skin one time for 1 dose.    sumatriptan (IMITREX) 100 MG tablet; 1 tab at headache onset; repeat in 1 hour prn    naproxen sodium (ANAPROX DS) 550 MG tablet; 1 tab at headache onset; repeat in 2-4 hr prn    ketorolac (Toradol) injection 60 mg

## 2024-11-26 NOTE — TELEPHONE ENCOUNTER
Received New Start PA request via [facility]  for Emgality 120mg. (Quantity:2, Day Supply:28)     Insurance: [employer]   Member ID:  [id number]  BIN: [id number]  PCN: [id number]  Group: [id number]     Ran Test claim via [place] & medication Rejects stating prior authorization is required.

## 2024-11-27 ENCOUNTER — NON-PROVIDER VISIT (OUTPATIENT)
Dept: MEDICAL GROUP | Age: 44
End: 2024-11-27
Payer: COMMERCIAL

## 2024-11-27 DIAGNOSIS — G89.29 CHRONIC NECK PAIN: ICD-10-CM

## 2024-11-27 DIAGNOSIS — M54.2 CHRONIC NECK PAIN: ICD-10-CM

## 2024-11-27 RX ORDER — TRIAMCINOLONE ACETONIDE 40 MG/ML
40 INJECTION, SUSPENSION INTRA-ARTICULAR; INTRAMUSCULAR ONCE
Status: COMPLETED | OUTPATIENT
Start: 2024-11-27 | End: 2024-11-27

## 2024-11-27 RX ADMIN — TRIAMCINOLONE ACETONIDE 40 MG: 40 INJECTION, SUSPENSION INTRA-ARTICULAR; INTRAMUSCULAR at 13:17

## 2024-11-27 NOTE — NON-PROVIDER
Gem Grubbs is a 44 y.o. female here for a non-provider visit for Cortizone  injection.    Reason for injection: neck pain   Order in MAR?: No  Patient supplied?:No  Minimum interval has been met for this injection (per MAR order): Yes    Patient tolerated injection and no adverse effects were observed or reported: Yes    # of Administrations remaining in MAR:

## 2025-02-21 ENCOUNTER — TELEPHONE (OUTPATIENT)
Dept: NEUROLOGY | Facility: MEDICAL CENTER | Age: 45
End: 2025-02-21
Payer: COMMERCIAL

## 2025-02-21 NOTE — TELEPHONE ENCOUNTER
Prior Authorization for Emgality 120mg  (Quantity: 1, Days: 28) has been submitted via Phone: [phone]    Insurance: [employer]  ID [id number]  BIN [id number]   [id number]  SUNDEEP CERON    Will follow up in 24-48 business hours.

## 2025-03-05 ENCOUNTER — TELEPHONE (OUTPATIENT)
Dept: NEUROLOGY | Facility: MEDICAL CENTER | Age: 45
End: 2025-03-05
Payer: COMMERCIAL

## 2025-03-05 NOTE — TELEPHONE ENCOUNTER
Prior Authorization for EMGALITY 120MG has been denied for a quantity of 1 , day supply 30    Prior authorization was denied per the following:   SEE ATTACHMENT     Prior Authorization denial reference number: [id number]    Insurance: [employer]   ID [id number]  BIN [id number]   [id number]  PCN [id number]      Next Steps:Proceed with appeal process. Generate proposed appeal for provider approval & signature.

## 2025-03-05 NOTE — TELEPHONE ENCOUNTER
Prior Authorization for Emgality 120mg (Quantity: 1, Days: 30) has been submitted via FAX [phone]     PA sent: 2/21/2025    Insurance: [employer]   ID [id number]  BIN [id number]   [id number]  SUNDEEP SOTO    Will follow up in 24-48 business hours.

## 2025-06-02 ENCOUNTER — APPOINTMENT (OUTPATIENT)
Dept: NEUROLOGY | Facility: MEDICAL CENTER | Age: 45
End: 2025-06-02
Attending: PSYCHIATRY & NEUROLOGY
Payer: COMMERCIAL